# Patient Record
Sex: MALE | Race: BLACK OR AFRICAN AMERICAN | Employment: OTHER | URBAN - METROPOLITAN AREA
[De-identification: names, ages, dates, MRNs, and addresses within clinical notes are randomized per-mention and may not be internally consistent; named-entity substitution may affect disease eponyms.]

---

## 2018-01-16 ENCOUNTER — HOSPITAL ENCOUNTER (OUTPATIENT)
Dept: MRI IMAGING | Age: 53
Discharge: HOME | End: 2018-01-16
Attending: ORTHOPAEDIC SURGERY
Payer: OTHER GOVERNMENT

## 2018-01-16 DIAGNOSIS — M54.12 CERVICAL RADICULOPATHY: ICD-10-CM

## 2018-01-16 PROCEDURE — 72141 MRI NECK SPINE W/O DYE: CPT

## 2018-01-29 ENCOUNTER — ED HISTORICAL/CONVERTED ENCOUNTER (OUTPATIENT)
Dept: OTHER | Age: 53
End: 2018-01-29

## 2018-02-14 ENCOUNTER — OP HISTORICAL/CONVERTED ENCOUNTER (OUTPATIENT)
Dept: OTHER | Age: 53
End: 2018-02-14

## 2018-02-27 ENCOUNTER — OP HISTORICAL/CONVERTED ENCOUNTER (OUTPATIENT)
Dept: OTHER | Age: 53
End: 2018-02-27

## 2018-11-29 ENCOUNTER — ED HISTORICAL/CONVERTED ENCOUNTER (OUTPATIENT)
Dept: OTHER | Age: 53
End: 2018-11-29

## 2021-04-07 ENCOUNTER — TELEPHONE (OUTPATIENT)
Dept: SLEEP MEDICINE | Age: 56
End: 2021-04-07

## 2021-04-07 NOTE — TELEPHONE ENCOUNTER
Called patient regarding sleep consult referral from [facility]. Patient stated that he already sees Dr. Chito Mejia and was on OAT. He lost quite a bit of weight that his OAT no longer fits as his jaw size has shrunk. A home sleep study was done and the referral was supposed to be for Dr. Felicita Andrade to be fitted for a new appliance. Patient given Kisha Koo contact # [phone] from the [place] to call to follow-up on above.

## 2021-04-08 ENCOUNTER — TELEPHONE (OUTPATIENT)
Dept: SLEEP MEDICINE | Age: 56
End: 2021-04-08

## 2021-04-08 NOTE — TELEPHONE ENCOUNTER
David Rivers called asking if we scheduled the patient for sleep medicine consult.  Explained that patient already sees Dr. Jose Valladares and the referral was suppose to go to Dr. Martine Queen for another OAT

## 2021-06-10 ENCOUNTER — TRANSCRIBE ORDER (OUTPATIENT)
Dept: SCHEDULING | Age: 56
End: 2021-06-10

## 2021-06-10 DIAGNOSIS — M19.012 GLENOHUMERAL ARTHRITIS, LEFT: ICD-10-CM

## 2021-06-10 DIAGNOSIS — R29.898 SHOULDER WEAKNESS: ICD-10-CM

## 2021-06-10 DIAGNOSIS — M25.612 SHOULDER STIFFNESS, LEFT: ICD-10-CM

## 2021-06-10 DIAGNOSIS — M25.212: Primary | ICD-10-CM

## 2021-06-10 DIAGNOSIS — M19.012 ARTHRITIS OF LEFT SHOULDER REGION: ICD-10-CM

## 2021-06-24 ENCOUNTER — HOSPITAL ENCOUNTER (OUTPATIENT)
Dept: CT IMAGING | Age: 56
Discharge: HOME | End: 2021-06-24
Attending: ORTHOPAEDIC SURGERY
Payer: MEDICARE

## 2021-06-24 DIAGNOSIS — M25.612 SHOULDER STIFFNESS, LEFT: ICD-10-CM

## 2021-06-24 DIAGNOSIS — M19.012 GLENOHUMERAL ARTHRITIS, LEFT: ICD-10-CM

## 2021-06-24 DIAGNOSIS — M25.212: ICD-10-CM

## 2021-06-24 DIAGNOSIS — M19.012 ARTHRITIS OF LEFT SHOULDER REGION: ICD-10-CM

## 2021-06-24 DIAGNOSIS — R29.898 SHOULDER WEAKNESS: ICD-10-CM

## 2021-06-24 PROCEDURE — 73200 CT UPPER EXTREMITY W/O DYE: CPT

## 2021-09-29 ENCOUNTER — HOSPITAL ENCOUNTER (OUTPATIENT)
Dept: PREADMISSION TESTING | Age: 56
Discharge: HOME | End: 2021-09-29
Payer: MEDICARE

## 2021-09-29 VITALS
HEART RATE: 55 BPM | WEIGHT: 244.71 LBS | SYSTOLIC BLOOD PRESSURE: 110 MMHG | TEMPERATURE: 97.8 F | DIASTOLIC BLOOD PRESSURE: 70 MMHG | HEIGHT: 74 IN | BODY MASS INDEX: 31.41 KG/M2

## 2021-09-29 LAB
ABO + RH BLD: NORMAL
ANION GAP SERPL CALC-SCNC: 3 MMOL/L (ref 5–15)
APPEARANCE UR: CLEAR
ATRIAL RATE: 57 BPM
BACTERIA URNS QL MICRO: NEGATIVE /HPF
BILIRUB UR QL: NEGATIVE
BLOOD GROUP ANTIBODIES SERPL: NORMAL
BUN SERPL-MCNC: 13 MG/DL (ref 6–20)
BUN/CREAT SERPL: 10 (ref 12–20)
CALCIUM SERPL-MCNC: 9.1 MG/DL (ref 8.5–10.1)
CALCULATED P AXIS, ECG09: 49 DEGREES
CALCULATED R AXIS, ECG10: 64 DEGREES
CALCULATED T AXIS, ECG11: 65 DEGREES
CHLORIDE SERPL-SCNC: 111 MMOL/L (ref 97–108)
CO2 SERPL-SCNC: 28 MMOL/L (ref 21–32)
COLOR UR: NORMAL
CREAT SERPL-MCNC: 1.27 MG/DL (ref 0.7–1.3)
DIAGNOSIS, 93000: NORMAL
EPITH CASTS URNS QL MICRO: NORMAL /LPF
ERYTHROCYTE [DISTWIDTH] IN BLOOD BY AUTOMATED COUNT: 14.2 % (ref 11.5–14.5)
EST. AVERAGE GLUCOSE BLD GHB EST-MCNC: 108 MG/DL
GLUCOSE SERPL-MCNC: 84 MG/DL (ref 65–100)
GLUCOSE UR STRIP.AUTO-MCNC: NEGATIVE MG/DL
HBA1C MFR BLD: 5.4 % (ref 4–5.6)
HCT VFR BLD AUTO: 46.1 % (ref 36.6–50.3)
HGB BLD-MCNC: 14.5 G/DL (ref 12.1–17)
HGB UR QL STRIP: NEGATIVE
HYALINE CASTS URNS QL MICRO: NORMAL /LPF (ref 0–5)
INR PPP: 0.9 (ref 0.9–1.1)
KETONES UR QL STRIP.AUTO: NEGATIVE MG/DL
LEUKOCYTE ESTERASE UR QL STRIP.AUTO: NEGATIVE
MCH RBC QN AUTO: 26.5 PG (ref 26–34)
MCHC RBC AUTO-ENTMCNC: 31.5 G/DL (ref 30–36.5)
MCV RBC AUTO: 84.3 FL (ref 80–99)
NITRITE UR QL STRIP.AUTO: NEGATIVE
NRBC # BLD: 0 K/UL (ref 0–0.01)
NRBC BLD-RTO: 0 PER 100 WBC
P-R INTERVAL, ECG05: 216 MS
PH UR STRIP: 5 [PH] (ref 5–8)
PLATELET # BLD AUTO: 232 K/UL (ref 150–400)
PMV BLD AUTO: 9.5 FL (ref 8.9–12.9)
POTASSIUM SERPL-SCNC: 4.3 MMOL/L (ref 3.5–5.1)
PROT UR STRIP-MCNC: NEGATIVE MG/DL
PROTHROMBIN TIME: 9.8 SEC (ref 9–11.1)
Q-T INTERVAL, ECG07: 406 MS
QRS DURATION, ECG06: 82 MS
QTC CALCULATION (BEZET), ECG08: 395 MS
RBC # BLD AUTO: 5.47 M/UL (ref 4.1–5.7)
RBC #/AREA URNS HPF: NORMAL /HPF (ref 0–5)
SODIUM SERPL-SCNC: 142 MMOL/L (ref 136–145)
SP GR UR REFRACTOMETRY: 1.02 (ref 1–1.03)
SPECIMEN EXP DATE BLD: NORMAL
UA: UC IF INDICATED,UAUC: NORMAL
UROBILINOGEN UR QL STRIP.AUTO: 0.2 EU/DL (ref 0.2–1)
VENTRICULAR RATE, ECG03: 57 BPM
WBC # BLD AUTO: 6.7 K/UL (ref 4.1–11.1)
WBC URNS QL MICRO: NORMAL /HPF (ref 0–4)

## 2021-09-29 PROCEDURE — 36415 COLL VENOUS BLD VENIPUNCTURE: CPT

## 2021-09-29 PROCEDURE — 86901 BLOOD TYPING SEROLOGIC RH(D): CPT

## 2021-09-29 PROCEDURE — 80048 BASIC METABOLIC PNL TOTAL CA: CPT

## 2021-09-29 PROCEDURE — 85610 PROTHROMBIN TIME: CPT

## 2021-09-29 PROCEDURE — 83036 HEMOGLOBIN GLYCOSYLATED A1C: CPT

## 2021-09-29 PROCEDURE — 85027 COMPLETE CBC AUTOMATED: CPT

## 2021-09-29 PROCEDURE — 93005 ELECTROCARDIOGRAM TRACING: CPT

## 2021-09-29 PROCEDURE — 81001 URINALYSIS AUTO W/SCOPE: CPT

## 2021-09-29 RX ORDER — FLUTICASONE PROPIONATE 50 MCG
2 SPRAY, SUSPENSION (ML) NASAL AS NEEDED
COMMUNITY

## 2021-09-29 RX ORDER — NAPROXEN 500 MG/1
500 TABLET ORAL AS NEEDED
COMMUNITY
End: 2021-10-08

## 2021-09-29 RX ORDER — ASCORBIC ACID 500 MG
500 TABLET ORAL
COMMUNITY

## 2021-09-29 RX ORDER — SEMAGLUTIDE 1.34 MG/ML
INJECTION, SOLUTION SUBCUTANEOUS
COMMUNITY

## 2021-09-29 RX ORDER — FACIAL-BODY WIPES
1250 EACH TOPICAL DAILY
COMMUNITY

## 2021-09-29 RX ORDER — BUPROPION HYDROCHLORIDE 300 MG/1
300 TABLET ORAL DAILY
COMMUNITY

## 2021-09-29 RX ORDER — BISMUTH SUBSALICYLATE 262 MG
1 TABLET,CHEWABLE ORAL DAILY
COMMUNITY

## 2021-09-29 RX ORDER — PRAZOSIN HYDROCHLORIDE 2 MG/1
2 CAPSULE ORAL
COMMUNITY

## 2021-09-29 RX ORDER — PREDNISONE 5 MG/1
5 TABLET ORAL AS NEEDED
COMMUNITY
End: 2021-10-08

## 2021-09-29 RX ORDER — DOCUSATE SODIUM 100 MG/1
100 CAPSULE, LIQUID FILLED ORAL DAILY
COMMUNITY

## 2021-09-29 RX ORDER — GLUCOSAMINE SULFATE 1500 MG
2000 POWDER IN PACKET (EA) ORAL DAILY
COMMUNITY

## 2021-09-29 RX ORDER — ATORVASTATIN CALCIUM 40 MG/1
40 TABLET, FILM COATED ORAL
COMMUNITY

## 2021-09-29 RX ORDER — LANOLIN ALCOHOL/MO/W.PET/CERES
CREAM (GRAM) TOPICAL
COMMUNITY

## 2021-09-29 RX ORDER — LANOLIN ALCOHOL/MO/W.PET/CERES
500 CREAM (GRAM) TOPICAL
COMMUNITY

## 2021-09-29 RX ORDER — FAMOTIDINE 20 MG/1
20 TABLET, FILM COATED ORAL AS NEEDED
COMMUNITY

## 2021-09-29 RX ORDER — OXYBUTYNIN CHLORIDE 5 MG/1
5 TABLET ORAL DAILY
COMMUNITY

## 2021-09-29 NOTE — PERIOP NOTES
PREOPERATIVE INSTRUCTIONS REVIEWED WITH PATIENT. PATIENT GIVEN 2 BOTTLES OF CHG SOAPS INSTRUCTIONS [REVIEWED/ TO BE REVIEWED  IN CLASS] ON USE OF CHG SOAPS. PATIENT GIVEN SSI INFECTION FAQ SHEET, INFORMATION SHEET ON DIABETIC TREATMENT CENTER AS WELL AS HAND WASHING TIPS SHEETS. MRSA/MSSA TREATMENT INSTRUCTION SHEET GIVEN WITH AN EXPLANATION TO PATIENT THAT THEY WILL BE NOTIFIED IF TREATMENT INSTRUCTIONS NEED TO BE INITIATED. PATIENT WAS GIVEN THE OPPORTUNITY TO ASK QUESTIONS ON THE INFORMATION PROVIDED.   COVID INSTRUCTIONS WERE GIVEN TO THE PT.

## 2021-09-30 ENCOUNTER — TRANSCRIBE ORDER (OUTPATIENT)
Dept: REGISTRATION | Age: 56
End: 2021-09-30

## 2021-09-30 DIAGNOSIS — Z01.812 PRE-PROCEDURE LAB EXAM: Primary | ICD-10-CM

## 2021-09-30 LAB
BACTERIA SPEC CULT: NORMAL
BACTERIA SPEC CULT: NORMAL
SERVICE CMNT-IMP: NORMAL

## 2021-09-30 NOTE — PERIOP NOTES
PAT Nurse Practitioner   Pre-Operative Chart Review/Assessment:-ORTHOPEDIC                Patient Name:  Jael Brown                                                           Age:   61 y.o.    :  1965     Today's Date:  10/1/2021     Date of PAT:   2021      Date of Surgery:    10/7/2021      Procedure(s):  Left Reverse Total Shoulder Arthroplasty     Surgeon:   Dr. Estefania Graham:      1)  PCP:  [facility]      2)  Cardiac Clearance:  EKG and METs reviewed. No further cardiac evaluation requested. 3)  Diabetic Treatment Consult:  Not indicated. A1c-5.4      4)  Sleep Apnea evaluation:   +FRANSISCO dx. Pt uses mouth guard. 5) Treatment for MRSA/Staph Aureus:  Neg      6) Additional Concerns:  GERD, dep/anx                Vital Signs:         Vitals:    21 0941   BP: 110/70   Pulse: (!) 55   Temp: 97.8 °F (36.6 °C)   Weight: 111 kg (244 lb 11.4 oz)   Height: 6' 2\" (1.88 m)            ____________________________________________  PAST MEDICAL HISTORY  Past Medical History:   Diagnosis Date    Arthritis     Chronic pain     Depression with anxiety     GERD (gastroesophageal reflux disease)     Sleep apnea     ORAL APPLIANCES      ____________________________________________  PAST SURGICAL HISTORY  Past Surgical History:   Procedure Laterality Date    HX GI      COLONOSCOPY    HX ORTHOPAEDIC Left     SHOULDER X 2    HX ORTHOPAEDIC Left     FOREARM X 2      ____________________________________________  HOME MEDICATIONS  Current Outpatient Medications   Medication Sig    ferrous sulfate (Iron) 325 mg (65 mg iron) tablet Take  by mouth Daily (before breakfast).  THIAMINE HCL, VITAMIN B1, PO Take 50 mg by mouth daily.  cholecalciferol (Vitamin D3) 25 mcg (1,000 unit) cap Take 2,000 Units by mouth daily.  ascorbic acid, vitamin C, (Vitamin C) 500 mg tablet Take 500 mg by mouth.  multivitamin (ONE A DAY) tablet Take 1 Tablet by mouth daily.     cyanocobalamin (VITAMIN B12) 500 mcg tablet Take 500 mcg by mouth every fourty-eight (48) hours.  docusate sodium (COLACE) 100 mg capsule Take 100 mg by mouth daily.  calcium polycarbophiL (Fiber Laxative, ca polycarbo,) 625 mg tablet Take 1,250 mg by mouth daily.  docosahexaenoic acid/epa (FISH OIL PO) Take 3,000 mg by mouth daily.  semaglutide (Ozempic) 0.25 mg or 0.5 mg/dose (2 mg/1.5 ml) subq pen by SubCUTAneous route every seven (7) days.  atorvastatin (LIPITOR) 40 mg tablet Take 40 mg by mouth nightly.  prazosin (MINIPRESS) 2 mg capsule Take 2 mg by mouth nightly.  buPROPion XL (WELLBUTRIN XL) 300 mg XL tablet Take 300 mg by mouth daily.  oxybutynin (DITROPAN) 5 mg tablet Take 5 mg by mouth daily.  fluticasone propionate (FLONASE) 50 mcg/actuation nasal spray 2 Sprays by Both Nostrils route as needed.  OTHER ALLERGY SHOTS EVERY OTHER WEEK    POLYETHYLENE GLYCOL 2 Capsules by Does Not Apply route daily.  famotidine (PEPCID) 20 mg tablet Take 20 mg by mouth as needed for Heartburn.  naproxen (Naprosyn) 500 mg tablet Take 500 mg by mouth as needed for Pain.  predniSONE (DELTASONE) 5 mg tablet Take 5 mg by mouth as needed. FOR GOUT    ALLOPURINOL PO Take 2 Tablets by mouth nightly.      No current facility-administered medications for this encounter.      ____________________________________________  ALLERGIES  Allergies   Allergen Reactions    Bactrim [Sulfamethoprim] Other (comments)     FREEDOM SHANIKA SYNDROME    Sulfa (Sulfonamide Antibiotics) Other (comments)     [employer] SYNDROME      ____________________________________________  SOCIAL HISTORY  Social History     Tobacco Use    Smoking status: Never Smoker    Smokeless tobacco: Never Used   Substance Use Topics    Alcohol use: Yes     Comment: BEER 2 X A MONTH      ____________________________________________   Internal Administration   First Dose COVID-19, PFIZER, MRNA, LNP-S, PF, 30MCG/0.3ML DOSE  03/16/2021   Second Dose COVID-19, PFIZER, MRNA, LNP-S, PF, 30MCG/0.3ML DOSE  04/06/2021      Last COVID Lab SARS-CoV-2 ( )   Date Value   10/04/2021 Not detected        Labs:     Hospital Outpatient Visit on 09/29/2021   Component Date Value Ref Range Status    Sodium 09/29/2021 142  136 - 145 mmol/L Final    Potassium 09/29/2021 4.3  3.5 - 5.1 mmol/L Final    Chloride 09/29/2021 111* 97 - 108 mmol/L Final    CO2 09/29/2021 28  21 - 32 mmol/L Final    Anion gap 09/29/2021 3* 5 - 15 mmol/L Final    Glucose 09/29/2021 84  65 - 100 mg/dL Final    BUN 09/29/2021 13  6 - 20 MG/DL Final    Creatinine 09/29/2021 1.27  0.70 - 1.30 MG/DL Final    BUN/Creatinine ratio 09/29/2021 10* 12 - 20   Final    GFR est AA 09/29/2021 >60  >60 ml/min/1.73m2 Final    GFR est non-AA 09/29/2021 58* >60 ml/min/1.73m2 Final    Estimated GFR is calculated using the IDMS-traceable Modification of Diet in Renal Disease (MDRD) Study equation, reported for both  Americans (GFRAA) and non- Americans (GFRNA), and normalized to 1.73m2 body surface area. The physician must decide which value applies to the patient.     Calcium 09/29/2021 9.1  8.5 - 10.1 MG/DL Final    WBC 09/29/2021 6.7  4.1 - 11.1 K/uL Final    RBC 09/29/2021 5.47  4.10 - 5.70 M/uL Final    HGB 09/29/2021 14.5  12.1 - 17.0 g/dL Final    HCT 09/29/2021 46.1  36.6 - 50.3 % Final    MCV 09/29/2021 84.3  80.0 - 99.0 FL Final    MCH 09/29/2021 26.5  26.0 - 34.0 PG Final    MCHC 09/29/2021 31.5  30.0 - 36.5 g/dL Final    RDW 09/29/2021 14.2  11.5 - 14.5 % Final    PLATELET 75/14/1660 003  150 - 400 K/uL Final    MPV 09/29/2021 9.5  8.9 - 12.9 FL Final    NRBC 09/29/2021 0.0  0  WBC Final    ABSOLUTE NRBC 09/29/2021 0.00  0.00 - 0.01 K/uL Final    Crossmatch Expiration 09/29/2021 10/10/2021,2359   Final    ABO/Rh(D) 09/29/2021 B POSITIVE   Final    Antibody screen 09/29/2021 NEG   Final    INR 09/29/2021 0.9  0.9 - 1.1   Final    A single therapeutic range for Vit K antagonists may not be optimal for all indications - see June, 2008 issue of Chest, American College of Chest Physicians Evidence-Based Clinical Practice Guidelines, 8th Edition.  Prothrombin time 09/29/2021 9.8  9.0 - 11.1 sec Final    Color 09/29/2021 YELLOW/STRAW    Final    Color Reference Range: Straw, Yellow or Dark Yellow    Appearance 09/29/2021 CLEAR  CLEAR   Final    Specific gravity 09/29/2021 1.019  1.003 - 1.030   Final    pH (UA) 09/29/2021 5.0  5.0 - 8.0   Final    Protein 09/29/2021 Negative  NEG mg/dL Final    Glucose 09/29/2021 Negative  NEG mg/dL Final    Ketone 09/29/2021 Negative  NEG mg/dL Final    Bilirubin 09/29/2021 Negative  NEG   Final    Blood 09/29/2021 Negative  NEG   Final    Urobilinogen 09/29/2021 0.2  0.2 - 1.0 EU/dL Final    Nitrites 09/29/2021 Negative  NEG   Final    Leukocyte Esterase 09/29/2021 Negative  NEG   Final    UA:UC IF INDICATED 09/29/2021 CULTURE NOT INDICATED BY UA RESULT    Final    WBC 09/29/2021 0-4  0 - 4 /hpf Final    RBC 09/29/2021 0-5  0 - 5 /hpf Final    Epithelial cells 09/29/2021 FEW  FEW /lpf Final    Epithelial cell category consists of squamous cells and /or transitional urothelial cells. Renal tubular cells, if present, are separately identified as such.     Bacteria 09/29/2021 Negative  NEG /hpf Final    Hyaline cast 09/29/2021 0-2  0 - 5 /lpf Final    Ventricular Rate 09/29/2021 57  BPM Final    Atrial Rate 09/29/2021 57  BPM Final    P-R Interval 09/29/2021 216  ms Final    QRS Duration 09/29/2021 82  ms Final    Q-T Interval 09/29/2021 406  ms Final    QTC Calculation (Bezet) 09/29/2021 395  ms Final    Calculated P Axis 09/29/2021 49  degrees Final    Calculated R Axis 09/29/2021 64  degrees Final    Calculated T Axis 09/29/2021 65  degrees Final    Diagnosis 09/29/2021    Final                    Value:Sinus bradycardia  Nonspecific ST abnormality    No previous ECGs available  Confirmed by Margoth Lambert M.D., Lala [facility]one ([id number]) on 9/29/2021 8:52:25 PM      Hemoglobin A1c 09/29/2021 5.4  4.0 - 5.6 % Final    Comment: NEW METHOD  PLEASE NOTE NEW REFERENCE RANGE  (NOTE)  HbA1C Interpretive Ranges  <5.7              Normal  5.7 - 6.4         Consider Prediabetes  >6.5              Consider Diabetes      Est. average glucose 09/29/2021 108  mg/dL Final    Special Requests: 09/29/2021 NO SPECIAL REQUESTS    Final    Culture result: 09/29/2021 MRSA NOT PRESENT    Final       Skin:     Denies open wounds, cuts, sores, rashes or other areas of concern in PAT assessment.           Joslyn Iraheta NP

## 2021-10-04 ENCOUNTER — HOSPITAL ENCOUNTER (OUTPATIENT)
Dept: PREADMISSION TESTING | Age: 56
Discharge: HOME | End: 2021-10-04
Payer: MEDICARE

## 2021-10-04 DIAGNOSIS — Z01.812 PRE-PROCEDURE LAB EXAM: ICD-10-CM

## 2021-10-04 PROCEDURE — U0005 INFEC AGEN DETEC AMPLI PROBE: HCPCS

## 2021-10-05 LAB
SARS-COV-2, XPLCVT: NOT DETECTED
SOURCE, COVRS: NORMAL

## 2021-10-06 ENCOUNTER — ANESTHESIA EVENT (OUTPATIENT)
Dept: SURGERY | Age: 56
DRG: 483 | End: 2021-10-06
Payer: MEDICARE

## 2021-10-07 ENCOUNTER — ANESTHESIA (OUTPATIENT)
Dept: SURGERY | Age: 56
DRG: 483 | End: 2021-10-07
Payer: MEDICARE

## 2021-10-07 ENCOUNTER — HOSPITAL ENCOUNTER (INPATIENT)
Age: 56
LOS: 1 days | Discharge: HOME | DRG: 483 | End: 2021-10-08
Attending: ORTHOPAEDIC SURGERY | Admitting: ORTHOPAEDIC SURGERY
Payer: MEDICARE

## 2021-10-07 DIAGNOSIS — M12.812 ROTATOR CUFF ARTHROPATHY OF LEFT SHOULDER: Primary | ICD-10-CM

## 2021-10-07 PROBLEM — M12.819 ROTATOR CUFF ARTHROPATHY: Status: ACTIVE | Noted: 2021-10-07

## 2021-10-07 LAB
GLUCOSE BLD STRIP.AUTO-MCNC: 77 MG/DL (ref 65–117)
SERVICE CMNT-IMP: NORMAL

## 2021-10-07 PROCEDURE — 76010000176 HC OR TIME 4.5 TO 5 HR INTENSV-TIER 1: Performed by: ORTHOPAEDIC SURGERY

## 2021-10-07 PROCEDURE — 77030020274 HC MISC IMPL ORTHOPEDIC: Performed by: ORTHOPAEDIC SURGERY

## 2021-10-07 PROCEDURE — 74011000250 HC RX REV CODE- 250: Performed by: ORTHOPAEDIC SURGERY

## 2021-10-07 PROCEDURE — 77030002912 HC SUT ETHBND J&J -A: Performed by: ORTHOPAEDIC SURGERY

## 2021-10-07 PROCEDURE — 77030003601 HC NDL NRV BLK BBMI -A

## 2021-10-07 PROCEDURE — 76210000017 HC OR PH I REC 1.5 TO 2 HR: Performed by: ORTHOPAEDIC SURGERY

## 2021-10-07 PROCEDURE — 76060000040 HC ANESTHESIA 4.5 TO 5 HR: Performed by: ORTHOPAEDIC SURGERY

## 2021-10-07 PROCEDURE — 65270000029 HC RM PRIVATE

## 2021-10-07 PROCEDURE — 77030002933 HC SUT MCRYL J&J -A: Performed by: ORTHOPAEDIC SURGERY

## 2021-10-07 PROCEDURE — 77030040361 HC SLV COMPR DVT MDII -B: Performed by: ORTHOPAEDIC SURGERY

## 2021-10-07 PROCEDURE — 77030036560 HC SHLDR ARM PAD ABDUCTN S2SG -B: Performed by: ORTHOPAEDIC SURGERY

## 2021-10-07 PROCEDURE — 74011000250 HC RX REV CODE- 250: Performed by: NURSE ANESTHETIST, CERTIFIED REGISTERED

## 2021-10-07 PROCEDURE — 77030040922 HC BLNKT HYPOTHRM STRY -A

## 2021-10-07 PROCEDURE — 0LS40ZZ REPOSITION LEFT UPPER ARM TENDON, OPEN APPROACH: ICD-10-PCS | Performed by: ORTHOPAEDIC SURGERY

## 2021-10-07 PROCEDURE — 77030006822 HC BLD SAW SAG BRSM -B: Performed by: ORTHOPAEDIC SURGERY

## 2021-10-07 PROCEDURE — 74011250636 HC RX REV CODE- 250/636: Performed by: STUDENT IN AN ORGANIZED HEALTH CARE EDUCATION/TRAINING PROGRAM

## 2021-10-07 PROCEDURE — 2709999900 HC NON-CHARGEABLE SUPPLY: Performed by: ORTHOPAEDIC SURGERY

## 2021-10-07 PROCEDURE — 77030036638 HC ACC KT GPS KNE V2 EXAC -D: Performed by: ORTHOPAEDIC SURGERY

## 2021-10-07 PROCEDURE — 77030026438 HC STYL ET INTUB CARD -A: Performed by: NURSE ANESTHETIST, CERTIFIED REGISTERED

## 2021-10-07 PROCEDURE — 74011250636 HC RX REV CODE- 250/636: Performed by: ORTHOPAEDIC SURGERY

## 2021-10-07 PROCEDURE — 77030031139 HC SUT VCRL2 J&J -A: Performed by: ORTHOPAEDIC SURGERY

## 2021-10-07 PROCEDURE — P9045 ALBUMIN (HUMAN), 5%, 250 ML: HCPCS | Performed by: NURSE ANESTHETIST, CERTIFIED REGISTERED

## 2021-10-07 PROCEDURE — 74011250637 HC RX REV CODE- 250/637: Performed by: STUDENT IN AN ORGANIZED HEALTH CARE EDUCATION/TRAINING PROGRAM

## 2021-10-07 PROCEDURE — 74011250636 HC RX REV CODE- 250/636: Performed by: NURSE ANESTHETIST, CERTIFIED REGISTERED

## 2021-10-07 PROCEDURE — 82962 GLUCOSE BLOOD TEST: CPT

## 2021-10-07 PROCEDURE — 77030013708 HC HNDPC SUC IRR PULS STRY –B: Performed by: ORTHOPAEDIC SURGERY

## 2021-10-07 PROCEDURE — 0RRK00Z REPLACEMENT OF LEFT SHOULDER JOINT WITH REVERSE BALL AND SOCKET SYNTHETIC SUBSTITUTE, OPEN APPROACH: ICD-10-PCS | Performed by: ORTHOPAEDIC SURGERY

## 2021-10-07 PROCEDURE — 77030008684 HC TU ET CUF COVD -B: Performed by: NURSE ANESTHETIST, CERTIFIED REGISTERED

## 2021-10-07 PROCEDURE — 74011000258 HC RX REV CODE- 258: Performed by: NURSE ANESTHETIST, CERTIFIED REGISTERED

## 2021-10-07 PROCEDURE — 74011250637 HC RX REV CODE- 250/637: Performed by: ORTHOPAEDIC SURGERY

## 2021-10-07 PROCEDURE — C1776 JOINT DEVICE (IMPLANTABLE): HCPCS | Performed by: ORTHOPAEDIC SURGERY

## 2021-10-07 PROCEDURE — 77030002922 HC SUT FBRWRE ARTH -B: Performed by: ORTHOPAEDIC SURGERY

## 2021-10-07 DEVICE — IMPLANTABLE DEVICE
Type: IMPLANTABLE DEVICE | Site: SHOULDER | Status: FUNCTIONAL
Brand: EQUINOXE

## 2021-10-07 DEVICE — SHOULDER S3 TOT ADV REVRS -- IMPL CAPPED S3: Type: IMPLANTABLE DEVICE | Status: FUNCTIONAL

## 2021-10-07 RX ORDER — FENTANYL CITRATE 50 UG/ML
INJECTION, SOLUTION INTRAMUSCULAR; INTRAVENOUS AS NEEDED
Status: DISCONTINUED | OUTPATIENT
Start: 2021-10-07 | End: 2021-10-07 | Stop reason: HOSPADM

## 2021-10-07 RX ORDER — SODIUM CHLORIDE 9 MG/ML
125 INJECTION, SOLUTION INTRAVENOUS CONTINUOUS
Status: DISPENSED | OUTPATIENT
Start: 2021-10-07 | End: 2021-10-08

## 2021-10-07 RX ORDER — ONDANSETRON 2 MG/ML
4 INJECTION INTRAMUSCULAR; INTRAVENOUS AS NEEDED
Status: DISCONTINUED | OUTPATIENT
Start: 2021-10-07 | End: 2021-10-07 | Stop reason: HOSPADM

## 2021-10-07 RX ORDER — SODIUM CHLORIDE 0.9 % (FLUSH) 0.9 %
5-40 SYRINGE (ML) INJECTION AS NEEDED
Status: DISCONTINUED | OUTPATIENT
Start: 2021-10-07 | End: 2021-10-07 | Stop reason: HOSPADM

## 2021-10-07 RX ORDER — SODIUM CHLORIDE 0.9 % (FLUSH) 0.9 %
5-40 SYRINGE (ML) INJECTION AS NEEDED
Status: DISCONTINUED | OUTPATIENT
Start: 2021-10-07 | End: 2021-10-08 | Stop reason: HOSPADM

## 2021-10-07 RX ORDER — OXYCODONE HYDROCHLORIDE 5 MG/1
5 TABLET ORAL
Status: DISCONTINUED | OUTPATIENT
Start: 2021-10-07 | End: 2021-10-08 | Stop reason: HOSPADM

## 2021-10-07 RX ORDER — HYDROXYZINE HYDROCHLORIDE 10 MG/1
10 TABLET, FILM COATED ORAL
Status: DISCONTINUED | OUTPATIENT
Start: 2021-10-07 | End: 2021-10-08 | Stop reason: HOSPADM

## 2021-10-07 RX ORDER — FENTANYL CITRATE 50 UG/ML
25 INJECTION, SOLUTION INTRAMUSCULAR; INTRAVENOUS
Status: DISCONTINUED | OUTPATIENT
Start: 2021-10-07 | End: 2021-10-07 | Stop reason: HOSPADM

## 2021-10-07 RX ORDER — AMOXICILLIN 250 MG
1 CAPSULE ORAL 2 TIMES DAILY
Status: DISCONTINUED | OUTPATIENT
Start: 2021-10-07 | End: 2021-10-08 | Stop reason: HOSPADM

## 2021-10-07 RX ORDER — POLYETHYLENE GLYCOL 3350 17 G/17G
17 POWDER, FOR SOLUTION ORAL DAILY
Status: DISCONTINUED | OUTPATIENT
Start: 2021-10-08 | End: 2021-10-08 | Stop reason: HOSPADM

## 2021-10-07 RX ORDER — ONDANSETRON 2 MG/ML
4 INJECTION INTRAMUSCULAR; INTRAVENOUS
Status: DISCONTINUED | OUTPATIENT
Start: 2021-10-07 | End: 2021-10-08 | Stop reason: HOSPADM

## 2021-10-07 RX ORDER — LANOLIN ALCOHOL/MO/W.PET/CERES
50 CREAM (GRAM) TOPICAL DAILY
Status: DISCONTINUED | OUTPATIENT
Start: 2021-10-08 | End: 2021-10-08 | Stop reason: HOSPADM

## 2021-10-07 RX ORDER — OXYBUTYNIN CHLORIDE 5 MG/1
5 TABLET ORAL DAILY
Status: DISCONTINUED | OUTPATIENT
Start: 2021-10-08 | End: 2021-10-08 | Stop reason: HOSPADM

## 2021-10-07 RX ORDER — ROCURONIUM BROMIDE 10 MG/ML
INJECTION, SOLUTION INTRAVENOUS AS NEEDED
Status: DISCONTINUED | OUTPATIENT
Start: 2021-10-07 | End: 2021-10-07 | Stop reason: HOSPADM

## 2021-10-07 RX ORDER — BUPROPION HYDROCHLORIDE 150 MG/1
150 TABLET, EXTENDED RELEASE ORAL 2 TIMES DAILY
Status: DISCONTINUED | OUTPATIENT
Start: 2021-10-07 | End: 2021-10-08 | Stop reason: HOSPADM

## 2021-10-07 RX ORDER — SUCCINYLCHOLINE CHLORIDE 20 MG/ML
INJECTION INTRAMUSCULAR; INTRAVENOUS AS NEEDED
Status: DISCONTINUED | OUTPATIENT
Start: 2021-10-07 | End: 2021-10-07 | Stop reason: HOSPADM

## 2021-10-07 RX ORDER — LIDOCAINE HYDROCHLORIDE 20 MG/ML
INJECTION, SOLUTION EPIDURAL; INFILTRATION; INTRACAUDAL; PERINEURAL AS NEEDED
Status: DISCONTINUED | OUTPATIENT
Start: 2021-10-07 | End: 2021-10-07 | Stop reason: HOSPADM

## 2021-10-07 RX ORDER — SODIUM CHLORIDE 0.9 % (FLUSH) 0.9 %
5-40 SYRINGE (ML) INJECTION EVERY 8 HOURS
Status: DISCONTINUED | OUTPATIENT
Start: 2021-10-07 | End: 2021-10-07 | Stop reason: HOSPADM

## 2021-10-07 RX ORDER — ONDANSETRON 2 MG/ML
INJECTION INTRAMUSCULAR; INTRAVENOUS AS NEEDED
Status: DISCONTINUED | OUTPATIENT
Start: 2021-10-07 | End: 2021-10-07 | Stop reason: HOSPADM

## 2021-10-07 RX ORDER — ALLOPURINOL 100 MG/1
100 TABLET ORAL
Status: DISCONTINUED | OUTPATIENT
Start: 2021-10-07 | End: 2021-10-08 | Stop reason: HOSPADM

## 2021-10-07 RX ORDER — LANOLIN ALCOHOL/MO/W.PET/CERES
500 CREAM (GRAM) TOPICAL
Status: DISCONTINUED | OUTPATIENT
Start: 2021-10-07 | End: 2021-10-08 | Stop reason: HOSPADM

## 2021-10-07 RX ORDER — LIDOCAINE HYDROCHLORIDE 10 MG/ML
0.1 INJECTION, SOLUTION EPIDURAL; INFILTRATION; INTRACAUDAL; PERINEURAL AS NEEDED
Status: DISCONTINUED | OUTPATIENT
Start: 2021-10-07 | End: 2021-10-07 | Stop reason: HOSPADM

## 2021-10-07 RX ORDER — EPHEDRINE SULFATE/0.9% NACL/PF 50 MG/5 ML
SYRINGE (ML) INTRAVENOUS AS NEEDED
Status: DISCONTINUED | OUTPATIENT
Start: 2021-10-07 | End: 2021-10-07 | Stop reason: HOSPADM

## 2021-10-07 RX ORDER — OXYCODONE HYDROCHLORIDE 5 MG/1
10 TABLET ORAL
Status: DISCONTINUED | OUTPATIENT
Start: 2021-10-07 | End: 2021-10-08 | Stop reason: HOSPADM

## 2021-10-07 RX ORDER — HYDROMORPHONE HYDROCHLORIDE 2 MG/ML
INJECTION, SOLUTION INTRAMUSCULAR; INTRAVENOUS; SUBCUTANEOUS AS NEEDED
Status: DISCONTINUED | OUTPATIENT
Start: 2021-10-07 | End: 2021-10-07 | Stop reason: HOSPADM

## 2021-10-07 RX ORDER — FLUTICASONE PROPIONATE 50 MCG
2 SPRAY, SUSPENSION (ML) NASAL DAILY
Status: DISCONTINUED | OUTPATIENT
Start: 2021-10-08 | End: 2021-10-08 | Stop reason: HOSPADM

## 2021-10-07 RX ORDER — GENTAMICIN SULFATE 40 MG/ML
INJECTION, SOLUTION INTRAMUSCULAR; INTRAVENOUS AS NEEDED
Status: DISCONTINUED | OUTPATIENT
Start: 2021-10-07 | End: 2021-10-07 | Stop reason: HOSPADM

## 2021-10-07 RX ORDER — SODIUM CHLORIDE, SODIUM LACTATE, POTASSIUM CHLORIDE, CALCIUM CHLORIDE 600; 310; 30; 20 MG/100ML; MG/100ML; MG/100ML; MG/100ML
50 INJECTION, SOLUTION INTRAVENOUS CONTINUOUS
Status: DISCONTINUED | OUTPATIENT
Start: 2021-10-07 | End: 2021-10-07 | Stop reason: HOSPADM

## 2021-10-07 RX ORDER — BACITRACIN ZINC 500 UNIT/G
OINTMENT (GRAM) TOPICAL AS NEEDED
Status: DISCONTINUED | OUTPATIENT
Start: 2021-10-07 | End: 2021-10-07 | Stop reason: HOSPADM

## 2021-10-07 RX ORDER — PHENYLEPHRINE HCL IN 0.9% NACL 0.4MG/10ML
SYRINGE (ML) INTRAVENOUS
Status: DISCONTINUED | OUTPATIENT
Start: 2021-10-07 | End: 2021-10-07 | Stop reason: HOSPADM

## 2021-10-07 RX ORDER — ROPIVACAINE HYDROCHLORIDE 5 MG/ML
INJECTION, SOLUTION EPIDURAL; INFILTRATION; PERINEURAL
Status: COMPLETED | OUTPATIENT
Start: 2021-10-07 | End: 2021-10-07

## 2021-10-07 RX ORDER — ACETAMINOPHEN 325 MG/1
650 TABLET ORAL ONCE
Status: COMPLETED | OUTPATIENT
Start: 2021-10-07 | End: 2021-10-07

## 2021-10-07 RX ORDER — NALOXONE HYDROCHLORIDE 0.4 MG/ML
0.4 INJECTION, SOLUTION INTRAMUSCULAR; INTRAVENOUS; SUBCUTANEOUS AS NEEDED
Status: DISCONTINUED | OUTPATIENT
Start: 2021-10-07 | End: 2021-10-08 | Stop reason: HOSPADM

## 2021-10-07 RX ORDER — HYDROMORPHONE HYDROCHLORIDE 1 MG/ML
0.5 INJECTION, SOLUTION INTRAMUSCULAR; INTRAVENOUS; SUBCUTANEOUS
Status: DISCONTINUED | OUTPATIENT
Start: 2021-10-07 | End: 2021-10-08 | Stop reason: HOSPADM

## 2021-10-07 RX ORDER — LANOLIN ALCOHOL/MO/W.PET/CERES
2 CREAM (GRAM) TOPICAL
Status: DISCONTINUED | OUTPATIENT
Start: 2021-10-08 | End: 2021-10-08 | Stop reason: HOSPADM

## 2021-10-07 RX ORDER — ACETAMINOPHEN 325 MG/1
650 TABLET ORAL
Status: DISCONTINUED | OUTPATIENT
Start: 2021-10-07 | End: 2021-10-08 | Stop reason: HOSPADM

## 2021-10-07 RX ORDER — FENTANYL CITRATE 50 UG/ML
50 INJECTION, SOLUTION INTRAMUSCULAR; INTRAVENOUS AS NEEDED
Status: DISCONTINUED | OUTPATIENT
Start: 2021-10-07 | End: 2021-10-07 | Stop reason: HOSPADM

## 2021-10-07 RX ORDER — MELATONIN
1000 DAILY
Status: DISCONTINUED | OUTPATIENT
Start: 2021-10-08 | End: 2021-10-08 | Stop reason: HOSPADM

## 2021-10-07 RX ORDER — DEXAMETHASONE SODIUM PHOSPHATE 4 MG/ML
INJECTION, SOLUTION INTRA-ARTICULAR; INTRALESIONAL; INTRAMUSCULAR; INTRAVENOUS; SOFT TISSUE AS NEEDED
Status: DISCONTINUED | OUTPATIENT
Start: 2021-10-07 | End: 2021-10-07 | Stop reason: HOSPADM

## 2021-10-07 RX ORDER — SODIUM CHLORIDE 0.9 % (FLUSH) 0.9 %
5-40 SYRINGE (ML) INJECTION EVERY 8 HOURS
Status: DISCONTINUED | OUTPATIENT
Start: 2021-10-07 | End: 2021-10-08 | Stop reason: HOSPADM

## 2021-10-07 RX ORDER — ALBUMIN HUMAN 50 G/1000ML
SOLUTION INTRAVENOUS AS NEEDED
Status: DISCONTINUED | OUTPATIENT
Start: 2021-10-07 | End: 2021-10-07 | Stop reason: HOSPADM

## 2021-10-07 RX ORDER — HYDROMORPHONE HYDROCHLORIDE 1 MG/ML
0.2 INJECTION, SOLUTION INTRAMUSCULAR; INTRAVENOUS; SUBCUTANEOUS
Status: DISCONTINUED | OUTPATIENT
Start: 2021-10-07 | End: 2021-10-07 | Stop reason: HOSPADM

## 2021-10-07 RX ORDER — ASCORBIC ACID 500 MG
500 TABLET ORAL DAILY
Status: DISCONTINUED | OUTPATIENT
Start: 2021-10-08 | End: 2021-10-08 | Stop reason: HOSPADM

## 2021-10-07 RX ORDER — MIDAZOLAM HYDROCHLORIDE 1 MG/ML
1 INJECTION, SOLUTION INTRAMUSCULAR; INTRAVENOUS ONCE
Status: COMPLETED | OUTPATIENT
Start: 2021-10-07 | End: 2021-10-07

## 2021-10-07 RX ORDER — SODIUM CHLORIDE 9 MG/ML
INJECTION, SOLUTION INTRAVENOUS
Status: DISCONTINUED | OUTPATIENT
Start: 2021-10-07 | End: 2021-10-07 | Stop reason: HOSPADM

## 2021-10-07 RX ORDER — PROPOFOL 10 MG/ML
INJECTION, EMULSION INTRAVENOUS AS NEEDED
Status: DISCONTINUED | OUTPATIENT
Start: 2021-10-07 | End: 2021-10-07 | Stop reason: HOSPADM

## 2021-10-07 RX ORDER — ATORVASTATIN CALCIUM 40 MG/1
40 TABLET, FILM COATED ORAL
Status: DISCONTINUED | OUTPATIENT
Start: 2021-10-07 | End: 2021-10-08 | Stop reason: HOSPADM

## 2021-10-07 RX ORDER — FAMOTIDINE 20 MG/1
20 TABLET, FILM COATED ORAL 2 TIMES DAILY
Status: DISCONTINUED | OUTPATIENT
Start: 2021-10-07 | End: 2021-10-08 | Stop reason: HOSPADM

## 2021-10-07 RX ORDER — ASPIRIN 325 MG
325 TABLET, DELAYED RELEASE (ENTERIC COATED) ORAL 2 TIMES DAILY
Status: DISCONTINUED | OUTPATIENT
Start: 2021-10-07 | End: 2021-10-08 | Stop reason: HOSPADM

## 2021-10-07 RX ORDER — FACIAL-BODY WIPES
10 EACH TOPICAL DAILY PRN
Status: DISCONTINUED | OUTPATIENT
Start: 2021-10-09 | End: 2021-10-08 | Stop reason: HOSPADM

## 2021-10-07 RX ADMIN — Medication 5 MG: at 12:37

## 2021-10-07 RX ADMIN — ROCURONIUM BROMIDE 20 MG: 10 SOLUTION INTRAVENOUS at 12:25

## 2021-10-07 RX ADMIN — ROCURONIUM BROMIDE 10 MG: 10 SOLUTION INTRAVENOUS at 12:38

## 2021-10-07 RX ADMIN — ATORVASTATIN CALCIUM 40 MG: 40 TABLET, FILM COATED ORAL at 21:28

## 2021-10-07 RX ADMIN — OXYCODONE 10 MG: 5 TABLET ORAL at 21:28

## 2021-10-07 RX ADMIN — OXYCODONE 10 MG: 5 TABLET ORAL at 18:26

## 2021-10-07 RX ADMIN — ROCURONIUM BROMIDE 10 MG: 10 SOLUTION INTRAVENOUS at 14:28

## 2021-10-07 RX ADMIN — ONDANSETRON HYDROCHLORIDE 4 MG: 2 INJECTION, SOLUTION INTRAMUSCULAR; INTRAVENOUS at 15:30

## 2021-10-07 RX ADMIN — FENTANYL CITRATE 100 MCG: 50 INJECTION, SOLUTION INTRAMUSCULAR; INTRAVENOUS at 11:29

## 2021-10-07 RX ADMIN — SODIUM CHLORIDE, POTASSIUM CHLORIDE, SODIUM LACTATE AND CALCIUM CHLORIDE 50 ML/HR: 600; 310; 30; 20 INJECTION, SOLUTION INTRAVENOUS at 10:50

## 2021-10-07 RX ADMIN — ROCURONIUM BROMIDE 10 MG: 10 SOLUTION INTRAVENOUS at 13:11

## 2021-10-07 RX ADMIN — SUCCINYLCHOLINE CHLORIDE 180 MG: 20 INJECTION, SOLUTION INTRAMUSCULAR; INTRAVENOUS at 11:29

## 2021-10-07 RX ADMIN — BUPROPION HYDROCHLORIDE 150 MG: 150 TABLET, EXTENDED RELEASE ORAL at 18:27

## 2021-10-07 RX ADMIN — ALLOPURINOL 100 MG: 100 TABLET ORAL at 21:28

## 2021-10-07 RX ADMIN — DEXMEDETOMIDINE HYDROCHLORIDE 10 MCG: 100 INJECTION, SOLUTION, CONCENTRATE INTRAVENOUS at 15:35

## 2021-10-07 RX ADMIN — Medication 80 MCG: at 12:25

## 2021-10-07 RX ADMIN — WATER 2 G: 1 INJECTION INTRAMUSCULAR; INTRAVENOUS; SUBCUTANEOUS at 15:40

## 2021-10-07 RX ADMIN — ROCURONIUM BROMIDE 45 MG: 10 SOLUTION INTRAVENOUS at 11:40

## 2021-10-07 RX ADMIN — ROCURONIUM BROMIDE 10 MG: 10 SOLUTION INTRAVENOUS at 13:46

## 2021-10-07 RX ADMIN — FAMOTIDINE 20 MG: 20 TABLET ORAL at 18:27

## 2021-10-07 RX ADMIN — ASPIRIN 325 MG: 325 TABLET, COATED ORAL at 18:25

## 2021-10-07 RX ADMIN — PROPOFOL 250 MG: 10 INJECTION, EMULSION INTRAVENOUS at 11:29

## 2021-10-07 RX ADMIN — DEXAMETHASONE SODIUM PHOSPHATE 4 MG: 4 INJECTION, SOLUTION INTRAMUSCULAR; INTRAVENOUS at 11:40

## 2021-10-07 RX ADMIN — CYANOCOBALAMIN TAB 500 MCG 500 MCG: 500 TAB at 18:27

## 2021-10-07 RX ADMIN — Medication 25 MCG/MIN: at 11:49

## 2021-10-07 RX ADMIN — Medication 5 MG: at 12:44

## 2021-10-07 RX ADMIN — ALBUMIN (HUMAN) 250 ML: 12.5 INJECTION, SOLUTION INTRAVENOUS at 14:44

## 2021-10-07 RX ADMIN — FENTANYL CITRATE 50 MCG: 50 INJECTION INTRAMUSCULAR; INTRAVENOUS at 10:58

## 2021-10-07 RX ADMIN — ROPIVACAINE HYDROCHLORIDE 15 ML: 5 INJECTION, SOLUTION EPIDURAL; INFILTRATION; PERINEURAL at 11:02

## 2021-10-07 RX ADMIN — LIDOCAINE HYDROCHLORIDE 100 MG: 20 INJECTION, SOLUTION EPIDURAL; INFILTRATION; INTRACAUDAL; PERINEURAL at 11:29

## 2021-10-07 RX ADMIN — HYDROMORPHONE HYDROCHLORIDE 0.5 MG: 2 INJECTION, SOLUTION INTRAMUSCULAR; INTRAVENOUS; SUBCUTANEOUS at 15:56

## 2021-10-07 RX ADMIN — ROCURONIUM BROMIDE 5 MG: 10 SOLUTION INTRAVENOUS at 11:29

## 2021-10-07 RX ADMIN — ACETAMINOPHEN 650 MG: 325 TABLET ORAL at 10:41

## 2021-10-07 RX ADMIN — SUGAMMADEX 220 MG: 100 INJECTION, SOLUTION INTRAVENOUS at 15:51

## 2021-10-07 RX ADMIN — Medication 80 MCG: at 12:35

## 2021-10-07 RX ADMIN — MIDAZOLAM HYDROCHLORIDE 1 MG: 1 INJECTION, SOLUTION INTRAMUSCULAR; INTRAVENOUS at 10:58

## 2021-10-07 RX ADMIN — SODIUM CHLORIDE: 900 INJECTION, SOLUTION INTRAVENOUS at 13:21

## 2021-10-07 RX ADMIN — DOCUSATE SODIUM 50 MG AND SENNOSIDES 8.6 MG 1 TABLET: 8.6; 5 TABLET, FILM COATED ORAL at 18:25

## 2021-10-07 RX ADMIN — Medication 100 MCG: at 12:32

## 2021-10-07 RX ADMIN — WATER 2 G: 1 INJECTION INTRAMUSCULAR; INTRAVENOUS; SUBCUTANEOUS at 11:40

## 2021-10-07 NOTE — PROGRESS NOTES
Bedside and Verbal shift change report given to Ryan Freitas (oncoming nurse) by Surinder Shukla (offgoing nurse). Report included the following information SBAR, Kardex, Intake/Output and MAR.

## 2021-10-07 NOTE — ANESTHESIA POSTPROCEDURE EVALUATION
Post-Anesthesia Evaluation and Assessment    Patient: Brandon Vega MRN: [id number]  SSN: [id number]    YOB: 1965  Age: 61 y.o. Sex: male      I have evaluated the patient and they are stable and ready for discharge from the PACU. Cardiovascular Function/Vital Signs  Visit Vitals  BP (!) 91/56   Pulse 84   Temp 36.6 °C (97.9 °F)   Resp 19   Ht 6' 2\" (1.88 m)   Wt 111 kg (244 lb 11.4 oz)   SpO2 95%   BMI 31.42 kg/m²       Patient is status post General anesthesia for Procedure(s):  LEFT REVERSE TOTAL SHOULDER ARTHROPLASTY. Nausea/Vomiting: None    Postoperative hydration reviewed and adequate. Pain:  Pain Scale 1: Numeric (0 - 10) (10/07/21 1700)  Pain Intensity 1: 0 (10/07/21 1700)   Managed    Neurological Status:   Neuro (WDL): Within Defined Limits (10/07/21 1700)   At baseline    Mental Status, Level of Consciousness: Alert and  oriented to person, place, and time    Pulmonary Status:   O2 Device: Nasal cannula (10/07/21 1700)   Adequate oxygenation and airway patent    Complications related to anesthesia: None    Post-anesthesia assessment completed.  No concerns    Signed By: Mojgan Carreon MD     October 7, 2021

## 2021-10-07 NOTE — H&P
[facility] PRE-OP HISTORY AND PHYSICAL    Subjective:     Patient is a 61 y.o. male presented with a history of left shoulder pain. Onset of symptoms was gradual with gradually worsening course since that time. He is being admitted for surgical management of this condition. There are no problems to display for this patient. Past Medical History:   Diagnosis Date    Arthritis     Chronic pain     Depression with anxiety     GERD (gastroesophageal reflux disease)     Sleep apnea     ORAL APPLIANCES      Past Surgical History:   Procedure Laterality Date    HX GI      COLONOSCOPY    HX ORTHOPAEDIC Left     SHOULDER X 2    HX ORTHOPAEDIC Left     FOREARM X 2      Prior to Admission medications    Medication Sig Start Date End Date Taking? Authorizing Provider   ferrous sulfate (Iron) 325 mg (65 mg iron) tablet Take  by mouth Daily (before breakfast). Yes Provider, Historical   THIAMINE HCL, VITAMIN B1, PO Take 50 mg by mouth daily. Yes Provider, Historical   cholecalciferol (Vitamin D3) 25 mcg (1,000 unit) cap Take 2,000 Units by mouth daily. Yes Provider, Historical   ascorbic acid, vitamin C, (Vitamin C) 500 mg tablet Take 500 mg by mouth. Yes Provider, Historical   multivitamin (ONE A DAY) tablet Take 1 Tablet by mouth daily. Yes Provider, Historical   cyanocobalamin (VITAMIN B12) 500 mcg tablet Take 500 mcg by mouth every fourty-eight (48) hours. Yes Provider, Historical   docosahexaenoic acid/epa (FISH OIL PO) Take 3,000 mg by mouth daily. Yes Provider, Historical   atorvastatin (LIPITOR) 40 mg tablet Take 40 mg by mouth nightly. Yes Provider, Historical   prazosin (MINIPRESS) 2 mg capsule Take 2 mg by mouth nightly. Yes Provider, Historical   buPROPion XL (WELLBUTRIN XL) 300 mg XL tablet Take 300 mg by mouth daily. Yes Provider, Historical   oxybutynin (DITROPAN) 5 mg tablet Take 5 mg by mouth daily.    Yes Provider, Historical   POLYETHYLENE GLYCOL 2 Capsules by Does Not Apply route daily. Yes Provider, Historical   famotidine (PEPCID) 20 mg tablet Take 20 mg by mouth as needed for Heartburn. Yes Provider, Historical   predniSONE (DELTASONE) 5 mg tablet Take 5 mg by mouth as needed. FOR GOUT   Yes Provider, Historical   ALLOPURINOL PO Take 2 Tablets by mouth nightly. Yes Provider, Historical   docusate sodium (COLACE) 100 mg capsule Take 100 mg by mouth daily. Provider, Historical   calcium polycarbophiL (Fiber Laxative, ca polycarbo,) 625 mg tablet Take 1,250 mg by mouth daily. Provider, Historical   semaglutide (Ozempic) 0.25 mg or 0.5 mg/dose (2 mg/1.5 ml) subq pen by SubCUTAneous route every seven (7) days. Provider, Historical   fluticasone propionate (FLONASE) 50 mcg/actuation nasal spray 2 Sprays by Both Nostrils route as needed. Patient not taking: Reported on 10/7/2021    Provider, Historical   OTHER ALLERGY SHOTS EVERY OTHER WEEK    Provider, Historical   naproxen (Naprosyn) 500 mg tablet Take 500 mg by mouth as needed for Pain. Provider, Historical     Allergies   Allergen Reactions    Bactrim [Sulfamethoprim] Other (comments)     FREEDOM SHANIKA SYNDROME    Sulfa (Sulfonamide Antibiotics) Other (comments)     FREEDOM SHANIKA SYNDROME      Social History     Tobacco Use    Smoking status: Never Smoker    Smokeless tobacco: Never Used   Substance Use Topics    Alcohol use: Yes     Comment: BEER 2 X A MONTH      Family History   Problem Relation Age of Onset    Emphysema Mother     Diabetes Mother     No Known Problems Brother       Review of Systems  A comprehensive review of systems was negative except for that written in the HPI.     Objective:     Patient Vitals for the past 8 hrs:   BP Temp Pulse Resp SpO2 Height Weight   10/07/21 1017 115/75 98.1 °F (36.7 °C) (!) 59 17 96 % 6' 2\" (1.88 m) 111 kg (244 lb 11.4 oz)     Visit Vitals  /75 (BP 1 Location: Right arm, BP Patient Position: At rest)   Pulse (!) 59   Temp 98.1 °F (36.7 °C)   Resp 17   Ht 6' 2\" (1.88 m)   Wt 111 kg (244 lb 11.4 oz)   SpO2 96%   BMI 31.42 kg/m²     General:  Alert, cooperative, no distress, appears stated age. Head:  Normocephalic, without obvious abnormality, atraumatic. Eyes:  Conjunctivae/corneas clear. PERRL, EOMs intact. Fundi benign   Ears:  Normal TMs and external ear canals both ears. Nose: Nares normal. Septum midline. Mucosa normal. No drainage or sinus tenderness. Throat: Lips, mucosa, and tongue normal. Teeth and gums normal.   Neck: Supple, symmetrical, trachea midline, no adenopathy, thyroid: no enlargement/tenderness/nodules, no carotid bruit and no JVD. Back:   Symmetric, no curvature. ROM normal. No CVA tenderness. Lungs:   Clear to auscultation bilaterally. Chest wall:  No tenderness or deformity. Heart:  Regular rate and rhythm, S1, S2 normal, no murmur, click, rub or gallop. Abdomen:   Soft, non-tender. Bowel sounds normal. No masses,  No organomegaly. Extremities: Left shoulder: pain with limited ROM   Pulses: 2+ and symmetric all extremities. Skin: Skin color, texture, turgor normal. No rashes or lesions   Lymph nodes: Cervical, supraclavicular, and axillary nodes normal.   Neurologic: CNII-XII intact. Normal strength, sensation and reflexes throughout. Assessment:     Active Problems:    * No active hospital problems. *      Plan:     The various methods of treatment have been discussed with the patient and family. After consideration of risks, benefits and other options for treatment, the patient has consented to surgical intervention. Risks of infection, DVT, PE, MI, CVA and unforeseen events described to the patient. Additionally discussed the possibility of not being able to resolve all preop pain. Patient understands metal and plastic will be implanted in the body and understands the potential for revision surgery. Patient wishes to proceed with elective surgery.

## 2021-10-07 NOTE — ANESTHESIA PREPROCEDURE EVALUATION
Relevant Problems   No relevant active problems       Anesthetic History   No history of anesthetic complications            Review of Systems / Medical History  Patient summary reviewed, nursing notes reviewed and pertinent labs reviewed    Pulmonary        Sleep apnea        Comments: Uses oral appliances while sleeping   Neuro/Psych         Psychiatric history    Comments: Depression Cardiovascular              Hyperlipidemia    Exercise tolerance: >4 METS     GI/Hepatic/Renal     GERD           Endo/Other    Diabetes: type 2    Arthritis     Other Findings            Physical Exam    Airway  Mallampati: I  TM Distance: > 6 cm  Neck ROM: normal range of motion   Mouth opening: Normal     Cardiovascular    Rhythm: regular  Rate: normal         Dental  No notable dental hx       Pulmonary  Breath sounds clear to auscultation               Abdominal  Abdominal exam normal       Other Findings            Anesthetic Plan    ASA: 2  Anesthesia type: general and regional - interscalene block          Induction: Intravenous  Anesthetic plan and risks discussed with: Patient

## 2021-10-07 NOTE — ANESTHESIA PROCEDURE NOTES
Peripheral Block    Start time: 10/7/2021 11:01 AM  End time: 10/7/2021 11:10 AM  Performed by: Janet Mendoza DO  Authorized by: Janet Mendoza DO       Pre-procedure: Indications: at surgeon's request and post-op pain management    Preanesthetic Checklist: patient identified, risks and benefits discussed, site marked, timeout performed and patient being monitored      Block Type:   Block Type:   Interscalene  Laterality:  Left  Monitoring:  Standard ASA monitoring, continuous pulse ox, frequent vital sign checks, heart rate, responsive to questions and oxygen  Injection Technique:  Single shot  Procedures: ultrasound guided    Patient Position: supine  Prep: chlorhexidine    Location:  Interscalene  Needle Type:  Stimuplex  Needle Gauge:  21 G  Needle Localization:  Ultrasound guidance and anatomical landmarks  Medication Injected:  Ropivacaine (PF) (NAROPIN)(0.5%) 5 mg/mL injection, 15 mL  Med Admin Time: 10/7/2021 11:02 AM    Assessment:  Number of attempts:  2  Injection Assessment:  Incremental injection every 5 mL, local visualized surrounding nerve on ultrasound, negative aspiration for blood, no paresthesia, no intravascular symptoms and ultrasound image on chart  Patient tolerance:  Patient tolerated the procedure well with no immediate complications

## 2021-10-07 NOTE — PROGRESS NOTES
Patient assessed for readiness to ambulate. Vital Signs  Level of Consciousness: Alert (0)  Temp: 97.9 °F (36.6 °C)  Temp Source: Oral  Pulse (Heart Rate): 72  Heart Rate Source: Monitor  Cardiac Rhythm: Sinus Rhythm  Resp Rate: 18  BP: 112/72  MAP (Calculated): 85  BP 1 Location: Right arm  BP 1 Method: Automatic  BP Patient Position: At rest  MEWS Score: 1. Patient ambulated with assistance of 2 nurses. Patient ambulated without gait belt and walker. Patient walked to sidestepped to Head of Bed. Patient returned safely to bed.

## 2021-10-07 NOTE — PROGRESS NOTES
Primary Nurse Dinah Sparks and Alba Lawrence., RN performed a dual skin assessment on this patient No impairment noted  Raffaele score is 20

## 2021-10-07 NOTE — BRIEF OP NOTE
Brief Postoperative Note    Patient: Jazmyne Calvillo  YOB: 1965  MRN: [id number]    Date of Procedure: 10/7/2021     Pre-Op Diagnosis: LEFT SHOULDER ROTATOR CUFF ARTHROPATHY    Post-Op Diagnosis: Same as preoperative diagnosis. Procedure(s):  LEFT REVERSE TOTAL SHOULDER ARTHROPLASTY  Bicep tenodesis    Surgeon(s): Katina Houston MD    Surgical Assistant: Surg Asst-1: Randal LINARES    Anesthesia: General with interscalene block    Estimated Blood Loss (mL): less than 165     Complications: None    Specimens: bone discarded    Implants:   Implant Name Type Inv.  Item Serial No.  Lot No. LRB No. Used Action   Glenoid Baseplate   [id number] Geroldine Rigo NA Left 1 Implanted   KIT BONE SCR L38MM DIA4.5MM KYLAH SHLDR GRN COMPR LCK CAP RVS - [id number]  KIT BONE SCR L38MM DIA4.5MM KYLAH SHLDR GRN COMPR LCK CAP RVS [id number] [employer]WD NA Left 1 Implanted   KIT BONE SCR L42MM DIA4.5MM KYLAH SHLDR YEL COMPR LCK CAP RVS - [id number]  KIT BONE SCR L42MM DIA4.5MM KYLAH SHLDR YEL COMPR LCK CAP RVS [id number] [employer]WD NA Left 1 Implanted   SCR LCK CAP KIT 4.5X30MM KYLIE -- EQUINOXE - [id number]  SCR LCK CAP KIT 4.5X30MM KYLIE -- EQUINOXE [id number] [employer] NA Left 1 Implanted   KIT BONE SCR L42MM DIA4.5MM KYLAH SHLDR YEL COMPR LCK CAP RVS - [id number]  KIT BONE SCR L42MM DIA4.5MM KYLAH SHLDR YEL COMPR LCK CAP RVS [id number] [employer]WD NA Left 1 Implanted   SCR BNE LCK GLENOSPHERE -- EQUINOXE - [id number]  SCR BNE LCK GLENOSPHERE -- EQUINOXE [id number] [employer] NA Left 1 Implanted   KIT BONE SCR L18MM DIA4.5MM KYLAH SHLDR WHT COMPR LCK CAP RVS - [id number]  KIT BONE SCR L18MM DIA4.5MM KYLAH SHLDR WHT COMPR LCK CAP RVS [id number] [employer]WD NA Left 1 Implanted   SCR TORQUE DEFINING KIT -- EQUINOXE - [id number]  [facility] TORQUE DEFINING KIT -- Dala Race [id number] [employer] NA Left 1 Implanted   TRAY HUM ADPT REV +0 -- EQUINOXE - [id number]  TRAY HUM ADPT REV +0 -- Dala Race [id number] [employer] NA Left 1 Implanted STEM HUM [id number] SHLDR CARL PRESSFIT Jean Ortiz [id number]  STEM HUM [id number] SHLDR CARL PRESSFIT EQUINOXE [id number] [employer]WD NA Left 1 Implanted   Glenospere 38mm   [id number] [employer] NA Left 1 Implanted   LINER HUM [id number] +0MM OFFSET STD POLY FOR REV SHLDR SYS - [id number]  LINER HUM [id number] +0MM OFFSET STD POLY FOR REV SHLDR SYS [id number] [employer]WD NA Left 1 Implanted       Drains: * No LDAs found *    Findings: rotator cuff arthropathy    Electronically Signed by Elis Haider MD on 10/7/2021 at 3:36 PM

## 2021-10-08 VITALS
OXYGEN SATURATION: 96 % | WEIGHT: 244.71 LBS | HEART RATE: 99 BPM | DIASTOLIC BLOOD PRESSURE: 72 MMHG | SYSTOLIC BLOOD PRESSURE: 108 MMHG | BODY MASS INDEX: 31.41 KG/M2 | HEIGHT: 74 IN | TEMPERATURE: 99.3 F | RESPIRATION RATE: 16 BRPM

## 2021-10-08 PROCEDURE — 97535 SELF CARE MNGMENT TRAINING: CPT

## 2021-10-08 PROCEDURE — 97116 GAIT TRAINING THERAPY: CPT

## 2021-10-08 PROCEDURE — 74011250636 HC RX REV CODE- 250/636: Performed by: ORTHOPAEDIC SURGERY

## 2021-10-08 PROCEDURE — 74011250637 HC RX REV CODE- 250/637: Performed by: ORTHOPAEDIC SURGERY

## 2021-10-08 PROCEDURE — 97161 PT EVAL LOW COMPLEX 20 MIN: CPT

## 2021-10-08 PROCEDURE — 97110 THERAPEUTIC EXERCISES: CPT

## 2021-10-08 PROCEDURE — 97165 OT EVAL LOW COMPLEX 30 MIN: CPT

## 2021-10-08 PROCEDURE — 97530 THERAPEUTIC ACTIVITIES: CPT

## 2021-10-08 PROCEDURE — 74011000250 HC RX REV CODE- 250

## 2021-10-08 PROCEDURE — 74011000250 HC RX REV CODE- 250: Performed by: ORTHOPAEDIC SURGERY

## 2021-10-08 RX ORDER — OXYCODONE HYDROCHLORIDE 5 MG/1
10 TABLET ORAL
Qty: 42 TABLET | Refills: 0 | Status: SHIPPED | OUTPATIENT
Start: 2021-10-08 | End: 2021-10-11

## 2021-10-08 RX ORDER — WATER FOR INJECTION,STERILE
VIAL (ML) INJECTION
Status: COMPLETED
Start: 2021-10-08 | End: 2021-10-08

## 2021-10-08 RX ADMIN — DOCUSATE SODIUM 50 MG AND SENNOSIDES 8.6 MG 1 TABLET: 8.6; 5 TABLET, FILM COATED ORAL at 09:37

## 2021-10-08 RX ADMIN — WATER 2 G: 1 INJECTION INTRAMUSCULAR; INTRAVENOUS; SUBCUTANEOUS at 00:45

## 2021-10-08 RX ADMIN — ASPIRIN 325 MG: 325 TABLET, COATED ORAL at 09:38

## 2021-10-08 RX ADMIN — BUPROPION HYDROCHLORIDE 150 MG: 150 TABLET, EXTENDED RELEASE ORAL at 09:38

## 2021-10-08 RX ADMIN — ACETAMINOPHEN 650 MG: 325 TABLET ORAL at 07:05

## 2021-10-08 RX ADMIN — WATER 2 G: 1 INJECTION INTRAMUSCULAR; INTRAVENOUS; SUBCUTANEOUS at 09:40

## 2021-10-08 RX ADMIN — Medication 50 MG: at 09:37

## 2021-10-08 RX ADMIN — MULTIPLE VITAMINS W/ MINERALS TAB 1 TABLET: TAB at 09:37

## 2021-10-08 RX ADMIN — FLUTICASONE PROPIONATE 2 SPRAY: 50 SPRAY, METERED NASAL at 09:00

## 2021-10-08 RX ADMIN — WATER: 1 INJECTION INTRAMUSCULAR; INTRAVENOUS; SUBCUTANEOUS at 10:00

## 2021-10-08 RX ADMIN — Medication 10 ML: at 14:00

## 2021-10-08 RX ADMIN — OXYCODONE 10 MG: 5 TABLET ORAL at 00:46

## 2021-10-08 RX ADMIN — ACETAMINOPHEN 650 MG: 325 TABLET ORAL at 00:45

## 2021-10-08 RX ADMIN — OXYCODONE HYDROCHLORIDE AND ACETAMINOPHEN 500 MG: 500 TABLET ORAL at 09:38

## 2021-10-08 RX ADMIN — OXYCODONE 10 MG: 5 TABLET ORAL at 14:04

## 2021-10-08 RX ADMIN — FAMOTIDINE 20 MG: 20 TABLET ORAL at 09:39

## 2021-10-08 RX ADMIN — OXYCODONE 10 MG: 5 TABLET ORAL at 07:05

## 2021-10-08 RX ADMIN — Medication 1000 UNITS: at 09:36

## 2021-10-08 RX ADMIN — POLYETHYLENE GLYCOL 3350 17 G: 17 POWDER, FOR SOLUTION ORAL at 09:39

## 2021-10-08 RX ADMIN — OXYCODONE 10 MG: 5 TABLET ORAL at 04:03

## 2021-10-08 RX ADMIN — OXYBUTYNIN CHLORIDE 5 MG: 5 TABLET ORAL at 09:38

## 2021-10-08 RX ADMIN — FERROUS SULFATE TAB 325 MG (65 MG ELEMENTAL FE) 650 MG: 325 (65 FE) TAB at 09:36

## 2021-10-08 RX ADMIN — SODIUM CHLORIDE 125 ML/HR: 900 INJECTION, SOLUTION INTRAVENOUS at 05:11

## 2021-10-08 RX ADMIN — OXYCODONE 10 MG: 5 TABLET ORAL at 10:28

## 2021-10-08 NOTE — PROGRESS NOTES
Problem: Self Care Deficits Care Plan (Adult)  Goal: *Acute Goals and Plan of Care (Insert Text)  Description: Occupational Therapy Goals  Initiated 10/8/2021   1. Patient will complete upper body dressing with min A within 7 days. 2.  Patient will complete upper body bathing with min A within 7 days. 3.  Patient will complete lower body dressing with min A within 7 days. 4.  Patient will complete toileting with supervision within 7 days. 5.  Patient will complete toilet transfer with independence within 7 days. 6.  Patient will complete AROM for elbow/wrist/hand independently within 7 days. 7.  Patient will complete pendulum exercise program with independence within 7 days. FUNCTIONAL STATUS PRIOR TO ADMISSION: Pt is independent at home prior to surgery. He ambulates independently but has been using the cane periodically. HOME SUPPORT: Pt will have support from his wife at home. 10/8/2021 1402 by Stephania López OT  Outcome: Progressing Towards Goal     OCCUPATIONAL THERAPY EVALUATION  Patient: Eyad Waite (30 y.o. male)  Date: 10/8/2021  Primary Diagnosis: Rotator cuff arthropathy [M12.819]  Procedure(s) (LRB):  LEFT REVERSE TOTAL SHOULDER ARTHROPLASTY (Left) 1 Day Post-Op   Precautions:   Fall, WBAT    ASSESSMENT  Based on the objective data described below, the patient presents with decreased independence with self care and functional mobility following admission for L Rev. TSR. He was able to progress OOB for ADL training and education. He does have slight light headed feeling but BP stable when progressing out of bed. He was able to complete dressing activities overall at min A level and readjusted the sling for improved fit (obtained large sling and replaced the medium that was too small). He could still benefit from a larger size but we do not have it in stock in the hospital.  He will have support from his wife as needed at home.   He is independent with ADL training and education. Will follow up this pm for exercise education. Recommend PT consult due to impaired functional mobility at baseline due to tinnitus, chronic light headed feeling, and pt with general complaint of balance deficits. Discussed with charge nurse and obtained PT orders. Ancipitate pt to discharge home today. Follow up this pm for exercises. He did not complete but participated with demonstration and education for how to complete pendulum exercises for going home. He was instructed to start exercises 5 reps working towards 10 3 times daily. He reports understanding of training and education. Handout provided to patients wife. Current Level of Function Impacting Discharge (ADLs/self-care): min A     Functional Outcome Measure: The patient scored 70 on the Barthel Index outcome measure which is indicative of 30% impairment with ADl activities. Other factors to consider for discharge: debility, pain, balance deficits at baseline     Patient will benefit from skilled therapy intervention to address the above noted impairments. PLAN :  Recommendations and Planned Interventions: self care training, functional mobility training, therapeutic exercise, balance training, therapeutic activities, endurance activities, patient education, home safety training, and family training/education    Frequency/Duration: Patient will be followed by occupational therapy 5 times a week to address goals. Recommendation for discharge: (in order for the patient to meet his/her long term goals)  No skilled occupational therapy/ follow up rehabilitation needs identified at this time. This discharge recommendation:  Has been made in collaboration with the attending provider and/or case management    IF patient discharges home will need the following DME: none       SUBJECTIVE:   Patient stated I am feeling alright.     OBJECTIVE DATA SUMMARY:   HISTORY:   Past Medical History:   Diagnosis Date    Arthritis     Chronic pain     Depression with anxiety     GERD (gastroesophageal reflux disease)     Sleep apnea     ORAL APPLIANCES     Past Surgical History:   Procedure Laterality Date    HX GI      COLONOSCOPY    HX ORTHOPAEDIC Left     SHOULDER X 2    HX ORTHOPAEDIC Left     FOREARM X 2       Expanded or extensive additional review of patient history:     Home Situation  Home Environment: Private residence  # Steps to Enter: 5  Rails to Enter: Yes  Hand Rails : Right  Wheelchair Ramp: No  One/Two Story Residence: Two story, live on 1st floor  # of Interior Steps: 12  Interior Rails: Right  Lift Chair Available: No  Support Systems: Spouse/Significant Other  Patient Expects to be Discharged to[de-identified] [employer]  Current DME Used/Available at Home: Cane, straight  Tub or Shower Type: Shower    Hand dominance: Right    EXAMINATION OF PERFORMANCE DEFICITS:  Cognitive/Behavioral Status:  Neurologic State: Alert  Orientation Level: Oriented X4  Cognition: Appropriate for age attention/concentration  Perception: Appears intact  Perseveration: No perseveration noted  Safety/Judgement: Good awareness of safety precautions    Skin: see nursing notes    Edema: none noted    Hearing: Auditory  Auditory Impairment: None    Vision/Perceptual:                           Acuity: Within Defined Limits         Range of Motion:    AROM: Within functional limits (LUE s/p TSR)  PROM: Within functional limits (LUE s/p TSR)                      Strength:    Strength: Within functional limits (LUE s/p TSR)                Coordination:  Coordination: Within functional limits (LUE s/p TSR)  Fine Motor Skills-Upper: Right Intact; Left Intact    Gross Motor Skills-Upper: Right Intact; Left Impaired    Tone & Sensation:    Tone: Normal  Sensation: Impaired (neuropathy in hands and feet)                      Balance:  Sitting: Intact  Standing: Impaired; Without support  Standing - Static: Good  Standing - Dynamic : Fair    Functional Mobility and Transfers for ADLs:  Bed Mobility:  Supine to Sit: Supervision  Sit to Supine: Supervision  Scooting: Supervision    Transfers:  Sit to Stand: Supervision  Stand to Sit: Supervision  Bed to Chair: Supervision  Bathroom Mobility: Supervision/set up  Toilet Transfer : Supervision    ADL Assessment:  Feeding: Supervision    Oral Facial Hygiene/Grooming: Supervision    Bathing: Minimum assistance    Upper Body Dressing: Minimum assistance    Lower Body Dressing: Supervision    Toileting: Supervision                ADL Intervention and task modifications:     Dressing Training:  Pt received education and training for lamine dressing techniques following shoulder replacement surgery. Pt provided education for donning button down shirts as well and pull over cotton shirts. Button down shirts: educated to don surgical arm first and then to adjust over his back and don strong arm. Pt educated that he can use both hands to button shirts. T-shirt: educated pt to don over surgical arm first, then adjust over head, and then don strong arm in shirt. Educated patient that a size larger does make this a little easier for home. Overall complete upper body dressing with min A. Also received education and training for how to properly don abduction wedge sling. Adjust sling for proper comfort and educated pt how to adjust strapping for home. Cognitive Retraining  Safety/Judgement: Good awareness of safety precautions    Functional Measure:    Barthel Index:  Bathin  Bladder: 10  Bowels: 10  Groomin  Dressin  Feeding: 10  Mobility: 10  Stairs: 5  Toilet Use: 5  Transfer (Bed to Chair and Back): 10  Total: 70/100      The Barthel ADL Index: Guidelines  1. The index should be used as a record of what a patient does, not as a record of what a patient could do.   2. The main aim is to establish degree of independence from any help, physical or verbal, however minor and for whatever reason. 3. The need for supervision renders the patient not independent. 4. A patient's performance should be established using the best available evidence. Asking the patient, friends/relatives and nurses are the usual sources, but direct observation and common sense are also important. However direct testing is not needed. 5. Usually the patient's performance over the preceding 24-48 hours is important, but occasionally longer periods will be relevant. 6. Middle categories imply that the patient supplies over 50 per cent of the effort. 7. Use of aids to be independent is allowed. Score Interpretation (from [address] [facility] 83)    Independent   60-79 Minimally independent   40-59 Partially dependent   20-39 Very dependent   <20 Totally dependent     -Marybel Kline., Barthel, DMarlonW. (1965). Functional evaluation: the Barthel Index. [address] [facility] ([address] [place] [address] (1997). The Barthel activities of daily living index: self-reporting versus actual performance in the old (> or = 75 years). Journal [address] 45(7), 14 [facility], JMarlonMarlon., [facility]., [facility] Luz Elena. (1999). Measuring the change in disability after inpatient rehabilitation; comparison of the responsiveness of the Barthel Index and Functional Bannister Measure. Journal of Neurology, Neurosurgery, and Psychiatry, 66(4), 409-029. Jermaine Cain, [place].CHUCK, ROSALIA Dunne, & Freddy Marks M.A. (2004) Assessment of post-stroke quality of life in cost-effectiveness studies: The usefulness of the Barthel Index and the EuroQoL-5D.  Quality of Life Research, 15, 556-66         Occupational Therapy Evaluation Charge Determination   History Examination Decision-Making   LOW Complexity : Brief history review  HIGH Complexity : 5 or more performance deficits relating to physical, cognitive , or psychosocial skils that result in activity limitations and / or participation restrictions HIGH Complexity : Patient presents with comorbidities that affect occupational performance. Signifigant modification of tasks or assistance (eg, physical or verbal) with assessment (s) is necessary to enable patient to complete evaluation       Based on the above components, the patient evaluation is determined to be of the following complexity level: LOW   Pain Ratin/10 pain in shoulder    Activity Tolerance:   Good    After treatment patient left in no apparent distress:    Sitting in chair and Call bell within reach    COMMUNICATION/EDUCATION:   The patients plan of care was discussed with: Physical therapist, Registered nurse, and charge nurse . Home safety education was provided and the patient/caregiver indicated understanding. and Patient/family have participated as able in goal setting and plan of care. This patients plan of care is appropriate for delegation to [facility].     Thank you for this referral.  Gabriela Fournier OT  Time Calculation: 27 mins

## 2021-10-08 NOTE — PROGRESS NOTES
PHYSICAL THERAPY EVALUATION/DISCHARGE  Patient: Lisa Ramirez (48 y.o. male)  Date: 10/8/2021  Primary Diagnosis: Rotator cuff arthropathy [M12.819]  Procedure(s) (LRB):  LEFT REVERSE TOTAL SHOULDER ARTHROPLASTY (Left) 1 Day Post-Op   Precautions:   Fall, WBAT      ASSESSMENT  Based on the objective data described below, the patient presents with history of balance issues due to tinnitus/peripheral neuropathy/hypoactive vestibular system. He is a war . Patient had L TSR and PT asked to see if patient is safe to go home from mobility standpoint. Spent time discussing patient's balance issues and educating patient and wife on fall prevention to include: importance of supportive footwear, especially with decreased sensation in feet/use of night lights and well-lit rooms/effects of head turns and rapid position changes on balance organs. Patient ambulated safely using a straight cane and safely negotiated steps using right rail up, cane down and utilizing unilateral stepping. Patient has single point cane and walking stick at home. Patient is cleared for discharge from PT standpoint as he is at his baseline level of functioning. .  Functional Outcome Measure: The patient scored 18/28 on the Tinetti outcome measure which is indicative of high fall risk. .      Other factors to consider for discharge: Motivated/A & O x 4/Supportive Family/Independent PLOF     Further skilled acute physical therapy is not indicated at this time. PLAN :  Recommendation for discharge: (in order for the patient to meet his/her long term goals)  To be determined: [facility]    This discharge recommendation:  Has been made in collaboration with the attending provider and/or case management    IF patient discharges home will need the following DME: patient owns DME required for discharge       SUBJECTIVE:   Patient stated I know what makes me dizzy.     OBJECTIVE DATA SUMMARY:   HISTORY:    Past Medical History:   Diagnosis Date    Arthritis     Chronic pain     Depression with anxiety     GERD (gastroesophageal reflux disease)     Sleep apnea     ORAL APPLIANCES     Past Surgical History:   Procedure Laterality Date    HX GI      COLONOSCOPY    HX ORTHOPAEDIC Left     SHOULDER X 2    HX ORTHOPAEDIC Left     FOREARM X 2       Prior level of function: PLOF: Independent with ADLs and IADLs. Occasional use of SPC. Personal factors and/or comorbidities impacting plan of care: Motivated/A & O x 4/Supportive Family/Independent PLOF    Home Situation  Home Environment: Private residence  # Steps to Enter: 5  Rails to Enter: Yes  Hand Rails : Right  Wheelchair Ramp: No  One/Two Story Residence: Two story, live on 1st floor  # of Interior Steps: 12  Interior Rails: Right  Lift Chair Available: No  Support Systems: Spouse/Significant Other  Patient Expects to be Discharged to[de-identified] [employer]  Current DME Used/Available at Home: Cane, straight  Tub or Shower Type: Shower    EXAMINATION/PRESENTATION/DECISION MAKING:   Critical Behavior:  Neurologic State: Alert  Orientation Level: Oriented X4  Cognition: Appropriate for age attention/concentration  Safety/Judgement: Good awareness of safety precautions  Hearing: Auditory  Auditory Impairment: None    Range Of Motion:  AROM:  (LUE s/p TSR)           PROM: Within functional limits (LEs)           Strength:    Strength:  Within functional limits (LEs)                    Tone & Sensation:   Tone: Normal              Sensation: Impaired (peripheral neuropathy)               Coordination:  Coordination: Within functional limits (LEs)  Vision:   Acuity: Within Defined Limits  Functional Mobility:  Bed Mobility:     Supine to Sit: Supervision  Sit to Supine: Supervision  Scooting: Supervision  Transfers:  Sit to Stand: Supervision  Stand to Sit: Supervision        Bed to Chair: Supervision              Balance:   Sitting: Intact  Standing: Impaired; Without support  Standing - Static: Good  Standing - Dynamic : Fair  Ambulation/Gait Training:  Distance (ft): 150 Feet (ft)  Assistive Device: Cane, straight;Gait belt  Ambulation - Level of Assistance: Contact guard assistance (PT held gait belt for safety)        Gait Abnormalities: Trunk sway increased        Base of Support: Widened     Speed/Rosmery: Slow           Interventions: Safety awareness training;Verbal cues        Stairs:  Number of Stairs Trained: 4  Stairs - Level of Assistance: Supervision   Rail Use: Right         Functional Measure:  Tinetti test:    Sitting Balance: 1  Arises: 1  Attempts to Rise: 2  Immediate Standing Balance: 1  Standing Balance: 1  Nudged: 1  Eyes Closed: 0  Turn 360 Degrees - Continuous/Discontinuous: 1  Turn 360 Degrees - Steady/Unsteady: 0  Sitting Down: 2  Balance Score: 10 Balance total score  Indication of Gait: 1  R Step Length/Height: 1  L Step Length/Height: 1  R Foot Clearance: 1  L Foot Clearance: 1  Step Symmetry: 1  Step Continuity: 1  Path: 1  Trunk: 0  Walking Time: 0  Gait Score: 8 Gait total score  Total Score: 18/28 Overall total score         Tinetti Tool Score Risk of Falls  <19 = High Fall Risk  19-24 = Moderate Fall Risk  25-28 = Low Fall Risk  Tinetti ME. Performance-Oriented Assessment of Mobility Problems in Elderly Patients. [place] 66; [id number].  (Scoring Description: PT Bulletin Feb. 10, 1993)    Older adults: Dante Mena et al, 2009; n = 1000 [facility] elderly evaluated with ABC, CINDY, ADL, and IADL)  · Mean CINDY score for males aged 69-68 years = 26.21(3.40)  · Mean CINDY score for females age 69-68 years = 25.16(4.30)  · Mean CINDY score for males over 80 years = 23.29(6.02)  · Mean CINDY score for females over 80 years = 17.20(8.32)            Physical Therapy Evaluation Charge Determination   History Examination Presentation Decision-Making   MEDIUM  Complexity : 1-2 comorbidities / personal factors will impact the outcome/ POC  MEDIUM Complexity : 3 Standardized tests and measures addressing body structure, function, activity limitation and / or participation in recreation  LOW Complexity : Stable, uncomplicated  LOW Complexity : FOTO score of       Based on the above components, the patient evaluation is determined to be of the following complexity level: LOW     Pain Ratin/10    Activity Tolerance:   Good      After treatment patient left in no apparent distress:   Sitting in chair, Call bell within reach, Caregiver / family present and nurse notified. COMMUNICATION/EDUCATION:   The patients plan of care was discussed with: Occupational therapist and Registered nurse. Fall prevention education was provided and the patient/caregiver indicated understanding., Patient/family have participated as able in goal setting and plan of care. and Patient/family agree to work toward stated goals and plan of care.     Thank you for this referral.  Kaylee Nicole   Time Calculation: 45 mins

## 2021-10-08 NOTE — PROGRESS NOTES
Bedside shift change report given to RICARDO Paez (oncoming nurse) by Jeovany Warner RN (offgoing nurse). Report included the following information SBAR, Kardex, Output.

## 2021-10-08 NOTE — PROGRESS NOTES
POD 1 Day Post-Op    Procedure:  Procedure(s):  LEFT REVERSE TOTAL SHOULDER ARTHROPLASTY    Subjective:     Patient doing ok. Painfully this am    Objective:     Blood pressure 104/64, pulse 88, temperature 99.3 °F (37.4 °C), resp. rate 16, height 6' 2\" (1.88 m), weight 111 kg (244 lb 11.4 oz), SpO2 96 %. Temp (24hrs), Av.3 °F (36.8 °C), Min:97.9 °F (36.6 °C), Max:99.3 °F (37.4 °C)      Physical Exam:  Examination of the left shoulder reveals that the incision is clean, dry and intact. Sensation is intact to light touch.  mild swelling. Sensation of axillary nerve intact. Normal sensation distally. Moving digits with full power. no calf pain.   NVI    Labs:   Lab Results   Component Value Date/Time    HGB 14.5 2021 09:37 AM         Assessment:     Active Problems:    Rotator cuff arthropathy (10/7/2021)        Plan/Recommendations/Medical Decision Making:     Continue physical therapy  Ice and elevate  Home today

## 2021-10-08 NOTE — DISCHARGE INSTRUCTIONS
Postoperative Instructions    Medications/Diet    1. Eat only light, non-greasy foods today  2. Take pain medicine with food  3. While taking pain medicines, you may not operate a vehicle, heavy machinery, or appliances  4. While taking pain medicines, you may not drink alcoholic beverages  5. While taking pain medicines, you may not make critical decisions or sign legal papers  6. If you have any reactions to your medicines, stop taking them and call my office immediately  7. If you are not allergic, take one 325mg aspirin twice a day to help prevent blood clots  8. Please keep in mind that constipation is a very common side effect of taking narcotic pain medication. We recommend that patients take precautions to prevent constipation:   Drink plenty of water (6-8 glasses of 8 oz. a day)   Avoid alcohol, caffeine, and dairy products   Eat plenty of fiber (fruits, vegetables and whole grains)   Take an over the counter stool softener (colace or dulcolax)          Activity/Exercise    1. You may move the arm as directed by physical therapist  2. You may take arm out of sling and move elbow as necessary. Must wear sling while out of the house and while sleeping  3. You may change dressing daily. If no drainage, you may leave dressing off.  4. You may shower on post operative day #7  5. Please keep ice applied to the shoulder for the first 72 hours or as long as pain or swelling persist. Do not apply ice directly to skin, or allow water to leak on your dressing    Emergency/Follow-Up    1. Please notify my office at [phone] if you develop any fever (101° or above), unexpected warmth, redness or swelling in your shoulder  2. Please call if your fingers/toes become cold, purple, numb, or there is excessive bleeding  3. Please call the office within 24 hours at [phone] ([phone]) to schedule a follow up appointment next week  4.  Please call the office before 3pm on Friday if you do not have enough pain medicine for the weekend.  Narcotic pain medication cannot be called into your pharmacy and the prescription must be picked up at our office

## 2021-10-08 NOTE — PROGRESS NOTES
Discharge information reviewed with patient and his wife who was at bedside. Pain medication prescription printed and given to patient. IV line d/c with no incidents. Patient verbalized understanding of discharge instructions with no further concerns or questions at this time. Patient transported off the unit for discharge.

## 2021-10-14 NOTE — DISCHARGE SUMMARY
Discharge Summary     Patient ID:  Wu Diaz  [id number]  61 y.o.  1965    Admit Date: 10/7/2021    Discharge Date:    Admission Diagnoses: Rotator cuff arthropathy [M12.819]    Surgeon: Billye Blizzard, MD    Last Procedure: Procedure(s):  LEFT [address] Course: Normal hospital course for this procedure. Significant Diagnostic Studies: none    Discharge Diagnosis: Active Problems:    Rotator cuff arthropathy (10/7/2021)         Condition on Discharge: good    Disposition:Home    Followup Care:  Discharge Condition: good  See surgical instructions  Regular Diet  Keep wound clean and dry    Follow-up Information     Follow up With Specialties Details Why Contact Info    Tammie Deluca MD Internal Medicine, Internal Medicine   [address] [place] [address]  [phone]            [place] med list:   Discharge Medication List as of 10/8/2021  3:08 PM      START taking these medications    Details   !! oxyCODONE IR (ROXICODONE) 5 mg immediate release tablet Take 2 Tablets by mouth every four (4) hours as needed for Pain for up to 3 days. Max Daily Amount: 60 mg., Print, Disp-42 Tablet, R-0      !! oxyCODONE IR (ROXICODONE) 5 mg immediate release tablet Take 2 Tablets by mouth every four (4) hours as needed for Pain for up to 3 days. Max Daily Amount: 60 mg., Print, Disp-42 Tablet, R-0       !! - Potential duplicate medications found. Please discuss with provider. CONTINUE these medications which have NOT CHANGED    Details   ferrous sulfate (Iron) 325 mg (65 mg iron) tablet Take  by mouth Daily (before breakfast). , Historical Med      THIAMINE HCL, VITAMIN B1, PO Take 50 mg by mouth daily. , Historical Med      cholecalciferol (Vitamin D3) 25 mcg (1,000 unit) cap Take 2,000 Units by mouth daily. , Historical Med      ascorbic acid, vitamin C, (Vitamin C) 500 mg tablet Take 500 mg by mouth., Historical Med      multivitamin (ONE A DAY) tablet Take 1 Tablet by mouth daily. , Historical Med      cyanocobalamin (VITAMIN B12) 500 mcg tablet Take 500 mcg by mouth every fourty-eight (48) hours. , Historical Med      docosahexaenoic acid/epa (FISH OIL PO) Take 3,000 mg by mouth daily. , Historical Med      atorvastatin (LIPITOR) 40 mg tablet Take 40 mg by mouth nightly., Historical Med      prazosin (MINIPRESS) 2 mg capsule Take 2 mg by mouth nightly., Historical Med      buPROPion XL (WELLBUTRIN XL) 300 mg XL tablet Take 300 mg by mouth daily. , Historical Med      oxybutynin (DITROPAN) 5 mg tablet Take 5 mg by mouth daily. , Historical Med      POLYETHYLENE GLYCOL 2 Capsules by Does Not Apply route daily. , Historical Med      famotidine (PEPCID) 20 mg tablet Take 20 mg by mouth as needed for Heartburn., Historical Med      ALLOPURINOL PO Take 2 Tablets by mouth nightly., Historical Med      docusate sodium (COLACE) 100 mg capsule Take 100 mg by mouth daily. , Historical Med      calcium polycarbophiL (Fiber Laxative, ca polycarbo,) 625 mg tablet Take 1,250 mg by mouth daily. , Historical Med      semaglutide (Ozempic) 0.25 mg or 0.5 mg/dose (2 mg/1.5 ml) subq pen by SubCUTAneous route every seven (7) days. , Historical Med      fluticasone propionate (FLONASE) 50 mcg/actuation nasal spray 2 Sprays by Both Nostrils route as needed., Historical Med      OTHER ALLERGY SHOTS EVERY OTHER WEEK, Historical Med         STOP taking these medications       naproxen (Naprosyn) 500 mg tablet Comments:   Reason for Stopping:         predniSONE (DELTASONE) 5 mg tablet Comments:   Reason for Stopping:                  Home Going Instructions:   Patient to follow up in one - two weeks. Notify my office if develop fever, chills, redness, unbearble pain or temp.>102. Patient to follow discharge instructions. Patient to call [phone] to set up follow up appointment.         Signed:  Damian Iverson MD  10/14/2021  7:18 AM

## 2021-10-23 VITALS — BODY MASS INDEX: 30.42 KG/M2 | HEIGHT: 74 IN | WEIGHT: 237 LBS

## 2021-10-23 PROBLEM — M12.812 ROTATOR CUFF ARTHROPATHY, LEFT: Status: ACTIVE | Noted: 2021-10-07

## 2021-10-23 PROBLEM — M75.122 COMPLETE ROTATOR CUFF TEAR OF LEFT SHOULDER: Status: ACTIVE | Noted: 2021-10-23

## 2021-10-23 PROBLEM — M25.612 LIMITATION OF JOINT MOTION OF LEFT SHOULDER: Status: ACTIVE | Noted: 2021-10-23

## 2021-10-23 PROBLEM — M25.512 LEFT SHOULDER PAIN: Status: ACTIVE | Noted: 2021-10-23

## 2021-10-23 PROBLEM — Z96.612 S/P SHOULDER REPLACEMENT, LEFT: Status: ACTIVE | Noted: 2021-10-23

## 2021-10-23 PROBLEM — M19.012 ARTHRITIS OF LEFT SHOULDER REGION: Status: ACTIVE | Noted: 2021-10-23

## 2021-10-23 PROBLEM — R29.898 LEFT ARM WEAKNESS: Status: ACTIVE | Noted: 2021-10-23

## 2021-11-15 ENCOUNTER — OFFICE VISIT (OUTPATIENT)
Dept: ORTHOPEDIC SURGERY | Age: 56
End: 2021-11-15
Payer: MEDICARE

## 2021-11-15 DIAGNOSIS — M12.812 ROTATOR CUFF ARTHROPATHY, LEFT: ICD-10-CM

## 2021-11-15 DIAGNOSIS — Z96.612 STATUS POST REVERSE TOTAL SHOULDER REPLACEMENT, LEFT: Primary | ICD-10-CM

## 2021-11-15 PROCEDURE — G8417 CALC BMI ABV UP PARAM F/U: HCPCS | Performed by: ORTHOPAEDIC SURGERY

## 2021-11-15 PROCEDURE — G8427 DOCREV CUR MEDS BY ELIG CLIN: HCPCS | Performed by: ORTHOPAEDIC SURGERY

## 2021-11-15 PROCEDURE — 3017F COLORECTAL CA SCREEN DOC REV: CPT | Performed by: ORTHOPAEDIC SURGERY

## 2021-11-15 PROCEDURE — 99024 POSTOP FOLLOW-UP VISIT: CPT | Performed by: ORTHOPAEDIC SURGERY

## 2021-11-15 PROCEDURE — G8432 DEP SCR NOT DOC, RNG: HCPCS | Performed by: ORTHOPAEDIC SURGERY

## 2021-11-15 RX ORDER — TRAMADOL HYDROCHLORIDE 50 MG/1
50 TABLET ORAL
Qty: 30 TABLET | Refills: 0 | Status: CANCELLED | OUTPATIENT
Start: 2021-11-15 | End: 2021-11-18

## 2021-11-15 RX ORDER — TRAMADOL HYDROCHLORIDE 50 MG/1
50 TABLET ORAL
Qty: 60 TABLET | Refills: 0 | Status: SHIPPED | OUTPATIENT
Start: 2021-11-15 | End: 2021-11-18

## 2021-11-15 NOTE — PROGRESS NOTES
Bev Wick (: 1965) is a 61 y.o. male, patient, here for evaluation of the following chief complaint(s):  No chief complaint on file. HPI:    Patient returns the office now status post reverse total shoulder replacement. Patient states he is doing relatively well. He still does note some level of discomfort with movement of the shoulder. It seems to come to be intermittent in nature. He is here today with his wife. He continues to use his sling as prescribed. Allergies   Allergen Reactions    Bactrim [Sulfamethoprim] Other (comments)     FREEDOM SHANIKA SYNDROME    Sulfa (Sulfonamide Antibiotics) Other (comments)     FREEDOM SHANIKA SYNDROME       Current Outpatient Medications   Medication Sig    ferrous sulfate (Iron) 325 mg (65 mg iron) tablet Take  by mouth Daily (before breakfast).  THIAMINE HCL, VITAMIN B1, PO Take 50 mg by mouth daily.  cholecalciferol (Vitamin D3) 25 mcg (1,000 unit) cap Take 2,000 Units by mouth daily.  ascorbic acid, vitamin C, (Vitamin C) 500 mg tablet Take 500 mg by mouth.  multivitamin (ONE A DAY) tablet Take 1 Tablet by mouth daily.  cyanocobalamin (VITAMIN B12) 500 mcg tablet Take 500 mcg by mouth every fourty-eight (48) hours.  docusate sodium (COLACE) 100 mg capsule Take 100 mg by mouth daily.  calcium polycarbophiL (Fiber Laxative, ca polycarbo,) 625 mg tablet Take 1,250 mg by mouth daily.  docosahexaenoic acid/epa (FISH OIL PO) Take 3,000 mg by mouth daily.  semaglutide (Ozempic) 0.25 mg or 0.5 mg/dose (2 mg/1.5 ml) subq pen by SubCUTAneous route every seven (7) days.  atorvastatin (LIPITOR) 40 mg tablet Take 40 mg by mouth nightly.  prazosin (MINIPRESS) 2 mg capsule Take 2 mg by mouth nightly.  buPROPion XL (WELLBUTRIN XL) 300 mg XL tablet Take 300 mg by mouth daily.  oxybutynin (DITROPAN) 5 mg tablet Take 5 mg by mouth daily.     fluticasone propionate (FLONASE) 50 mcg/actuation nasal spray 2 Sprays by Both Nostrils route as needed. (Patient not taking: Reported on 10/7/2021)    OTHER ALLERGY SHOTS EVERY OTHER WEEK    POLYETHYLENE GLYCOL 2 Capsules by Does Not Apply route daily.  famotidine (PEPCID) 20 mg tablet Take 20 mg by mouth as needed for Heartburn.  ALLOPURINOL PO Take 2 Tablets by mouth nightly. No current facility-administered medications for this visit. Past Medical History:   Diagnosis Date    Arthritis     Arthritis of left shoulder region 10/23/2021    Chronic pain     Complete rotator cuff tear of left shoulder 10/23/2021    Depression with anxiety     GERD (gastroesophageal reflux disease)     Left arm weakness 10/23/2021    Left shoulder pain 10/23/2021    Limitation of joint motion of left shoulder 10/23/2021    S/P shoulder replacement, left 10/23/2021    Sleep apnea     ORAL APPLIANCES        Past Surgical History:   Procedure Laterality Date    HX GI      COLONOSCOPY    HX ORTHOPAEDIC Left     SHOULDER X 2    HX ORTHOPAEDIC Left     FOREARM X 2       Family History   Problem Relation Age of Onset    Emphysema Mother     Diabetes Mother     Arthritis-osteo Mother     No Known Problems Brother         Social History     Socioeconomic History    Marital status:      Spouse name: Not on file    Number of children: Not on file    Years of education: Not on file    Highest education level: Not on file   Occupational History    Not on file   Tobacco Use    Smoking status: Never Smoker    Smokeless tobacco: Never Used   Substance and Sexual Activity    Alcohol use:  Yes     Alcohol/week: 2.0 standard drinks     Types: 2 Cans of beer per week     Comment: monthly    Drug use: Never    Sexual activity: Not on file   Other Topics Concern    Not on file   Social History Narrative    Not on file     Social Determinants of Health     Financial Resource Strain:     Difficulty of Paying Living Expenses: Not on file   Food Insecurity:     Worried About Running Out of Food in the Last Year: Not on file    Ran Out of Food in the Last Year: Not on file   Transportation Needs:     Lack of Transportation (Medical): Not on file    Lack of Transportation (Non-Medical): Not on file   Physical Activity:     Days of Exercise per Week: Not on file    Minutes of Exercise per Session: Not on file   Stress:     Feeling of Stress : Not on file   Social Connections:     Frequency of Communication with Friends and Family: Not on file    Frequency of Social Gatherings with Friends and Family: Not on file    Attends Restoration Services: Not on file    Active Member of [address] [place] [address] [employer] or Organizations: Not on file    Attends Club or Organization Meetings: Not on file    Marital Status: Not on file   Intimate Partner Violence:     Fear of Current or Ex-Partner: Not on file    Emotionally Abused: Not on file    Physically Abused: Not on file    Sexually Abused: Not on file   Housing Stability:     Unable to Pay for Housing in the Last Year: Not on file    Number of [place] in the Last Year: Not on file    Unstable Housing in the Last Year: Not on file       Review of Systems   Constitutional: Negative. HENT: Negative. Eyes: Negative. Cardiovascular: Negative. Gastrointestinal: Negative. Endocrine: Negative. Genitourinary: Negative. Musculoskeletal: Negative. Left reverse total shoulder post op   Skin: Negative. Allergic/Immunologic: Negative. Neurological: Negative. Hematological: Negative. Psychiatric/Behavioral: Negative. All other systems reviewed and are negative. Vitals: There were no vitals taken for this visit. There is no height or weight on file to calculate BMI. Ortho Exam     Left shoulder: Incision is healed. No effusion is noted. He has about 80 degrees of forward elevation, 60 degrees on abduction and 30 degrees of external rotation. Passively he has a little bit better range of motion.   His pain is well controlled. He has no crepitation. There is no swelling noted distally. Neurovascular examination is intact. ASSESSMENT/PLAN:    Patient is doing well. At this stage, we will advance out of the sling. He is to continue with physical therapy to incorporate active range of motion. We will write a prescription for Ultram at this stage. He is to use this sparingly. Patient is to return to the office in 4 weeks.         Basilia Glover MD

## 2021-12-13 ENCOUNTER — OFFICE VISIT (OUTPATIENT)
Dept: ORTHOPEDIC SURGERY | Age: 56
End: 2021-12-13
Payer: MEDICARE

## 2021-12-13 DIAGNOSIS — Z96.612 STATUS POST REVERSE TOTAL REPLACEMENT OF LEFT SHOULDER: Primary | ICD-10-CM

## 2021-12-13 PROCEDURE — 99024 POSTOP FOLLOW-UP VISIT: CPT | Performed by: ORTHOPAEDIC SURGERY

## 2021-12-13 NOTE — PROGRESS NOTES
Henna Antony (: 1965) is a 61 y.o. male, patient, here for evaluation of the following chief complaint(s):  Shoulder Pain (shoulder post op)       HPI:    Patient presents the office today now status post reverse total shoulder replacement. He describes a slow but continue improvement with his shoulder as far as range of motion. He has been attending physical therapy on a regular basis. Allergies   Allergen Reactions    Bactrim [Sulfamethoprim] Other (comments)     FREEDOM SHANIKA SYNDROME    Sulfa (Sulfonamide Antibiotics) Other (comments)     FREEDOM SHANIKA SYNDROME       Current Outpatient Medications   Medication Sig    ferrous sulfate (Iron) 325 mg (65 mg iron) tablet Take  by mouth Daily (before breakfast).  THIAMINE HCL, VITAMIN B1, PO Take 50 mg by mouth daily.  cholecalciferol (Vitamin D3) 25 mcg (1,000 unit) cap Take 2,000 Units by mouth daily.  ascorbic acid, vitamin C, (Vitamin C) 500 mg tablet Take 500 mg by mouth.  multivitamin (ONE A DAY) tablet Take 1 Tablet by mouth daily.  cyanocobalamin (VITAMIN B12) 500 mcg tablet Take 500 mcg by mouth every fourty-eight (48) hours.  docusate sodium (COLACE) 100 mg capsule Take 100 mg by mouth daily.  calcium polycarbophiL (Fiber Laxative, ca polycarbo,) 625 mg tablet Take 1,250 mg by mouth daily.  docosahexaenoic acid/epa (FISH OIL PO) Take 3,000 mg by mouth daily.  semaglutide (Ozempic) 0.25 mg or 0.5 mg/dose (2 mg/1.5 ml) subq pen by SubCUTAneous route every seven (7) days.  atorvastatin (LIPITOR) 40 mg tablet Take 40 mg by mouth nightly.  prazosin (MINIPRESS) 2 mg capsule Take 2 mg by mouth nightly.  buPROPion XL (WELLBUTRIN XL) 300 mg XL tablet Take 300 mg by mouth daily.  oxybutynin (DITROPAN) 5 mg tablet Take 5 mg by mouth daily.  fluticasone propionate (FLONASE) 50 mcg/actuation nasal spray 2 Sprays by Both Nostrils route as needed.  (Patient not taking: Reported on 10/7/2021)    OTHER ALLERGY SHOTS EVERY OTHER WEEK    POLYETHYLENE GLYCOL 2 Capsules by Does Not Apply route daily.  famotidine (PEPCID) 20 mg tablet Take 20 mg by mouth as needed for Heartburn.  ALLOPURINOL PO Take 2 Tablets by mouth nightly. No current facility-administered medications for this visit. Past Medical History:   Diagnosis Date    Arthritis     Arthritis of left shoulder region 10/23/2021    Chronic pain     Complete rotator cuff tear of left shoulder 10/23/2021    Depression with anxiety     GERD (gastroesophageal reflux disease)     Left arm weakness 10/23/2021    Left shoulder pain 10/23/2021    Limitation of joint motion of left shoulder 10/23/2021    S/P shoulder replacement, left 10/23/2021    Sleep apnea     ORAL APPLIANCES        Past Surgical History:   Procedure Laterality Date    HX GI      COLONOSCOPY    HX ORTHOPAEDIC Left     SHOULDER X 2    HX ORTHOPAEDIC Left     FOREARM X 2       Family History   Problem Relation Age of Onset    Emphysema Mother     Diabetes Mother     OSTEOARTHRITIS Mother     No Known Problems Brother         Social History     Socioeconomic History    Marital status:      Spouse name: Not on file    Number of children: Not on file    Years of education: Not on file    Highest education level: Not on file   Occupational History    Not on file   Tobacco Use    Smoking status: Never Smoker    Smokeless tobacco: Never Used   Substance and Sexual Activity    Alcohol use:  Yes     Alcohol/week: 2.0 standard drinks     Types: 2 Cans of beer per week     Comment: monthly    Drug use: Never    Sexual activity: Not on file   Other Topics Concern    Not on file   Social History Narrative    Not on file     Social Determinants of Health     Financial Resource Strain:     Difficulty of Paying Living Expenses: Not on file   Food Insecurity:     Worried About Running Out of Food in the Last Year: Not on file    [place] of Food in the Last Year: Not on file   Transportation Needs:     Lack of Transportation (Medical): Not on file    Lack of Transportation (Non-Medical): Not on file   Physical Activity:     Days of Exercise per Week: Not on file    Minutes of Exercise per Session: Not on file   Stress:     Feeling of Stress : Not on file   Social Connections:     Frequency of Communication with Friends and Family: Not on file    Frequency of Social Gatherings with Friends and Family: Not on file    Attends Protestant Services: Not on file    Active Member of [address] [place] [address] [employer] or Organizations: Not on file    Attends Club or Organization Meetings: Not on file    Marital Status: Not on file   Intimate Partner Violence:     Fear of Current or Ex-Partner: Not on file    Emotionally Abused: Not on file    Physically Abused: Not on file    Sexually Abused: Not on file   Housing Stability:     Unable to Pay for Housing in the Last Year: Not on file    Number of [place] in the Last Year: Not on file    Unstable Housing in the Last Year: Not on file       Review of Systems   Musculoskeletal:        Left shoulder post op       Vitals: There were no vitals taken for this visit. There is no height or weight on file to calculate BMI. Ortho Exam     Left shoulder: Incision is healed. He has about 95 degrees of forward elevation, 80 degrees lateral duction and 40 degrees of external rotation. He does have pain at the extremes of motion. Neurovascular examination is intact. ASSESSMENT/PLAN:    Patient is progressing as anticipated. He only had about 30 degrees of forward elevation preoperatively. It is going to be a slow process for him.   He is to continue with physical therapy and return to the office in 4 weeks        Teodora Mccormick MD

## 2022-01-17 ENCOUNTER — OFFICE VISIT (OUTPATIENT)
Dept: ORTHOPEDIC SURGERY | Age: 57
End: 2022-01-17
Payer: MEDICARE

## 2022-01-17 DIAGNOSIS — M24.612 ARTHROFIBROSIS OF LEFT SHOULDER: Primary | ICD-10-CM

## 2022-01-17 PROCEDURE — 99212 OFFICE O/P EST SF 10 MIN: CPT | Performed by: ORTHOPAEDIC SURGERY

## 2022-01-17 PROCEDURE — G8432 DEP SCR NOT DOC, RNG: HCPCS | Performed by: ORTHOPAEDIC SURGERY

## 2022-01-17 PROCEDURE — G8417 CALC BMI ABV UP PARAM F/U: HCPCS | Performed by: ORTHOPAEDIC SURGERY

## 2022-01-17 PROCEDURE — G8427 DOCREV CUR MEDS BY ELIG CLIN: HCPCS | Performed by: ORTHOPAEDIC SURGERY

## 2022-01-17 PROCEDURE — 3017F COLORECTAL CA SCREEN DOC REV: CPT | Performed by: ORTHOPAEDIC SURGERY

## 2022-01-17 NOTE — PROGRESS NOTES
Dale Saxena (: 1965) is a 61 y.o. male, patient, here for evaluation of the following chief complaint(s):  Shoulder Pain (left shoulder pain)       HPI:    Patient presents the office today now 3 months status post reverse total shoulder placed on the left. He does continue to have some level of discomfort of the shoulder particularly during his physical therapy sessions. He continues to struggle with range of motion. He has some questions of which I believe were proposed through his pain management doctor. There is some questions about nerve damage muscle damage as possibly being the source of chronic pain    Allergies   Allergen Reactions    Bactrim [Sulfamethoprim] Other (comments)     FREEDOM SHANIKA SYNDROME    Sulfa (Sulfonamide Antibiotics) Other (comments)     FREEDOM SHANIKA SYNDROME       Current Outpatient Medications   Medication Sig    ferrous sulfate (Iron) 325 mg (65 mg iron) tablet Take  by mouth Daily (before breakfast).  THIAMINE HCL, VITAMIN B1, PO Take 50 mg by mouth daily.  cholecalciferol (Vitamin D3) 25 mcg (1,000 unit) cap Take 2,000 Units by mouth daily.  ascorbic acid, vitamin C, (Vitamin C) 500 mg tablet Take 500 mg by mouth.  multivitamin (ONE A DAY) tablet Take 1 Tablet by mouth daily.  cyanocobalamin (VITAMIN B12) 500 mcg tablet Take 500 mcg by mouth every fourty-eight (48) hours.  docusate sodium (COLACE) 100 mg capsule Take 100 mg by mouth daily.  calcium polycarbophiL (Fiber Laxative, ca polycarbo,) 625 mg tablet Take 1,250 mg by mouth daily.  docosahexaenoic acid/epa (FISH OIL PO) Take 3,000 mg by mouth daily.  semaglutide (Ozempic) 0.25 mg or 0.5 mg/dose (2 mg/1.5 ml) subq pen by SubCUTAneous route every seven (7) days.  atorvastatin (LIPITOR) 40 mg tablet Take 40 mg by mouth nightly.  prazosin (MINIPRESS) 2 mg capsule Take 2 mg by mouth nightly.  buPROPion XL (WELLBUTRIN XL) 300 mg XL tablet Take 300 mg by mouth daily.     oxybutynin (DITROPAN) 5 mg tablet Take 5 mg by mouth daily.  fluticasone propionate (FLONASE) 50 mcg/actuation nasal spray 2 Sprays by Both Nostrils route as needed. (Patient not taking: Reported on 10/7/2021)    OTHER ALLERGY SHOTS EVERY OTHER WEEK    POLYETHYLENE GLYCOL 2 Capsules by Does Not Apply route daily.  famotidine (PEPCID) 20 mg tablet Take 20 mg by mouth as needed for Heartburn.  ALLOPURINOL PO Take 2 Tablets by mouth nightly. No current facility-administered medications for this visit. Past Medical History:   Diagnosis Date    Arthritis     Arthritis of left shoulder region 10/23/2021    Chronic pain     Complete rotator cuff tear of left shoulder 10/23/2021    Depression with anxiety     GERD (gastroesophageal reflux disease)     Left arm weakness 10/23/2021    Left shoulder pain 10/23/2021    Limitation of joint motion of left shoulder 10/23/2021    S/P shoulder replacement, left 10/23/2021    Sleep apnea     ORAL APPLIANCES        Past Surgical History:   Procedure Laterality Date    HX GI      COLONOSCOPY    HX ORTHOPAEDIC Left     SHOULDER X 2    HX ORTHOPAEDIC Left     FOREARM X 2       Family History   Problem Relation Age of Onset    Emphysema Mother     Diabetes Mother     OSTEOARTHRITIS Mother     No Known Problems Brother         Social History     Socioeconomic History    Marital status:      Spouse name: Not on file    Number of children: Not on file    Years of education: Not on file    Highest education level: Not on file   Occupational History    Not on file   Tobacco Use    Smoking status: Never Smoker    Smokeless tobacco: Never Used   Substance and Sexual Activity    Alcohol use:  Yes     Alcohol/week: 2.0 standard drinks     Types: 2 Cans of beer per week     Comment: monthly    Drug use: Never    Sexual activity: Not on file   Other Topics Concern    Not on file   Social History Narrative    Not on file     Social Determinants of Health     Financial Resource Strain:     Difficulty of Paying Living Expenses: Not on file   Food Insecurity:     Worried About Running Out of Food in the Last Year: Not on file    Rom[place] of Food in the Last Year: Not on file   Transportation Needs:     Lack of Transportation (Medical): Not on file    Lack of Transportation (Non-Medical): Not on file   Physical Activity:     Days of Exercise per Week: Not on file    Minutes of Exercise per Session: Not on file   Stress:     Feeling of Stress : Not on file   Social Connections:     Frequency of Communication with Friends and Family: Not on file    Frequency of Social Gatherings with Friends and Family: Not on file    Attends Mor[employer] Services: Not on file    Active Member of Clubs or Organizations: Not on file    Attends Club or Organization Meetings: Not on file    Marital Status: Not on file   Intimate Partner Violence:     Fear of Current or Ex-Partner: Not on file    Emotionally Abused: Not on file    Physically Abused: Not on file    Sexually Abused: Not on file   Housing Stability:     Unable to Pay for Housing in the Last Year: Not on file    Number of [place] in the Last Year: Not on file    Unstable Housing in the Last Year: Not on file       Review of Systems   Musculoskeletal:        Left shoulder pain       Vitals: There were no vitals taken for this visit. There is no height or weight on file to calculate BMI. Ortho Exam     Left shoulder: Incision is healed. Patient has normal sensation across the lateral deltoid. He has no atrophy deltoid. Patient has 65 degrees of forward elevation, 50 degrees lateral duction and 40 degrees of external rotation. He continues to have decreased strength in all planes of motion. Internal rotation is to his SI joint. He has no crepitation with manipulation of the shoulder. Passively, he has a very similar loss of range of motion particularly with forward elevation.   His pain is mostly noted at the extremes of motion. Neurovascular examination is otherwise intact    ASSESSMENT/PLAN:    Patient is status post reverse total shoulder replacement. Prior to this, he had 2 prior surgical procedures. I do not detect any neurological cause of his pain. In regards to his muscle injury, the patient does have chronic muscle damage. He has had recurrent massive rotator cuff tear which failed to heal.  That is permanent. However, this is not the cause of his current pain nor the cause of his lack of range of motion. This is secondary to arthritis of the shoulder which is most likely secondary to 2 prior surgical procedures. I do not believe any further surgery is indicated or necessary and he and his wife both agree. I would recommend continuing with physical therapy to advance passive range of motion. I do believe as he improves passive range of motion is active range of motion and strength will follow suit. Patient agrees.   He is to return to the office in the next 6 to 8 weeks        Katerine Jimenez MD

## 2022-02-24 DIAGNOSIS — Z96.612 STATUS POST REVERSE TOTAL REPLACEMENT OF LEFT SHOULDER: Primary | ICD-10-CM

## 2022-02-24 RX ORDER — LIDOCAINE 50 MG/G
1 PATCH TOPICAL EVERY 24 HOURS
Qty: 1 EACH | Refills: 1 | Status: SHIPPED | OUTPATIENT
Start: 2022-02-24

## 2022-03-16 ENCOUNTER — OFFICE VISIT (OUTPATIENT)
Dept: ORTHOPEDIC SURGERY | Age: 57
End: 2022-03-16
Payer: MEDICARE

## 2022-03-16 DIAGNOSIS — M24.512 CONTRACTURE OF LEFT SHOULDER: Primary | ICD-10-CM

## 2022-03-16 DIAGNOSIS — Z98.890 HISTORY OF REVERSE TOTAL REPLACEMENT OF LEFT SHOULDER JOINT: ICD-10-CM

## 2022-03-16 PROCEDURE — G8427 DOCREV CUR MEDS BY ELIG CLIN: HCPCS | Performed by: ORTHOPAEDIC SURGERY

## 2022-03-16 PROCEDURE — G8417 CALC BMI ABV UP PARAM F/U: HCPCS | Performed by: ORTHOPAEDIC SURGERY

## 2022-03-16 PROCEDURE — 3017F COLORECTAL CA SCREEN DOC REV: CPT | Performed by: ORTHOPAEDIC SURGERY

## 2022-03-16 PROCEDURE — G8432 DEP SCR NOT DOC, RNG: HCPCS | Performed by: ORTHOPAEDIC SURGERY

## 2022-03-16 PROCEDURE — 99213 OFFICE O/P EST LOW 20 MIN: CPT | Performed by: ORTHOPAEDIC SURGERY

## 2022-03-16 RX ORDER — AMOXICILLIN 500 MG/1
CAPSULE ORAL
Qty: 4 CAPSULE | Refills: 4 | Status: SHIPPED | OUTPATIENT
Start: 2022-03-16

## 2022-03-16 NOTE — LETTER
3/16/2022    Patient: Marya Dillon   YOB: 1965   Date of Visit: 3/16/2022     Dominique Camejo MD  [address] [place] [id number]  Via Fax: [phone]    Dear Dominique Camejo MD,      Thank you for referring Mr. Geovanni Maynard to [facility] for evaluation. My notes for this consultation are attached. If you have questions, please do not hesitate to call me. I look forward to following your patient along with you.       Sincerely,    Rosi Josue MD

## 2022-03-16 NOTE — PROGRESS NOTES
Hunter Garcia (: 1965) is a 61 y.o. male, patient, here for evaluation of the following chief complaint(s):  Shoulder Pain (shoulder pain)       HPI:    Patient returns to the office now status post reverse total shoulder replacement. Patient states he is doing about the same as he was since seen last.  Since this time, he has been to the [address] [place] [address]. He sounds as if he is being entered into a protocol for a nerve stimulator. He is describing discomfort that is mostly located along the anterior aspect of the shoulder. He does note occasional discomfort that radiates down to the deltoid area. He denies numbness or tingling. He states he went to the  [facility] and recently had an x-ray performed on the left shoulder. Patient states the physician order the x-ray told him that everything looked good and there was no evidence of looseness of the joint    Allergies   Allergen Reactions    Bactrim [Sulfamethoprim] Other (comments)     FREEDOM SHANIKA SYNDROME    Sulfa (Sulfonamide Antibiotics) Other (comments)     FREEDOM SHANIKA SYNDROME       Current Outpatient Medications   Medication Sig    lidocaine (LIDODERM) 5 % 1 Patch by TransDERmal route every twenty-four (24) hours. Apply patch to the affected area for 12 hours a day and remove for 12 hours a day.  ferrous sulfate (Iron) 325 mg (65 mg iron) tablet Take  by mouth Daily (before breakfast).  THIAMINE HCL, VITAMIN B1, PO Take 50 mg by mouth daily.  cholecalciferol (Vitamin D3) 25 mcg (1,000 unit) cap Take 2,000 Units by mouth daily.  ascorbic acid, vitamin C, (Vitamin C) 500 mg tablet Take 500 mg by mouth.  multivitamin (ONE A DAY) tablet Take 1 Tablet by mouth daily.  cyanocobalamin (VITAMIN B12) 500 mcg tablet Take 500 mcg by mouth every fourty-eight (48) hours.  docusate sodium (COLACE) 100 mg capsule Take 100 mg by mouth daily.  calcium polycarbophiL (Fiber Laxative, ca polycarbo,) 625 mg tablet Take 1,250 mg by mouth daily.     docosahexaenoic acid/epa (FISH OIL PO) Take 3,000 mg by mouth daily.  semaglutide (Ozempic) 0.25 mg or 0.5 mg/dose (2 mg/1.5 ml) subq pen by SubCUTAneous route every seven (7) days.  atorvastatin (LIPITOR) 40 mg tablet Take 40 mg by mouth nightly.  prazosin (MINIPRESS) 2 mg capsule Take 2 mg by mouth nightly.  buPROPion XL (WELLBUTRIN XL) 300 mg XL tablet Take 300 mg by mouth daily.  oxybutynin (DITROPAN) 5 mg tablet Take 5 mg by mouth daily.  fluticasone propionate (FLONASE) 50 mcg/actuation nasal spray 2 Sprays by Both Nostrils route as needed. (Patient not taking: Reported on 10/7/2021)    OTHER ALLERGY SHOTS EVERY OTHER WEEK    POLYETHYLENE GLYCOL 2 Capsules by Does Not Apply route daily.  famotidine (PEPCID) 20 mg tablet Take 20 mg by mouth as needed for Heartburn.  ALLOPURINOL PO Take 2 Tablets by mouth nightly. No current facility-administered medications for this visit.        Past Medical History:   Diagnosis Date    Arthritis     Arthritis of left shoulder region 10/23/2021    Chronic pain     Complete rotator cuff tear of left shoulder 10/23/2021    Depression with anxiety     GERD (gastroesophageal reflux disease)     Left arm weakness 10/23/2021    Left shoulder pain 10/23/2021    Limitation of joint motion of left shoulder 10/23/2021    S/P shoulder replacement, left 10/23/2021    Sleep apnea     ORAL APPLIANCES        Past Surgical History:   Procedure Laterality Date    HX GI      COLONOSCOPY    HX ORTHOPAEDIC Left     SHOULDER X 2    HX ORTHOPAEDIC Left     FOREARM X 2       Family History   Problem Relation Age of Onset    Emphysema Mother     Diabetes Mother     OSTEOARTHRITIS Mother     No Known Problems Brother         Social History     Socioeconomic History    Marital status:      Spouse name: Not on file    Number of children: Not on file    Years of education: Not on file    Highest education level: Not on file   Occupational History  Not on file   Tobacco Use    Smoking status: Never Smoker    Smokeless tobacco: Never Used   Substance and Sexual Activity    Alcohol use: Yes     Alcohol/week: 2.0 standard drinks     Types: 2 Cans of beer per week     Comment: monthly    Drug use: Never    Sexual activity: Not on file   Other Topics Concern    Not on file   Social History Narrative    Not on file     Social Determinants of Health     Financial Resource Strain:     Difficulty of Paying Living Expenses: Not on file   Food Insecurity:     Worried About Running Out of Food in the Last Year: Not on file    Romina of Food in the Last Year: Not on file   Transportation Needs:     Lack of Transportation (Medical): Not on file    Lack of Transportation (Non-Medical): Not on file   Physical Activity:     Days of Exercise per Week: Not on file    Minutes of Exercise per Session: Not on file   Stress:     Feeling of Stress : Not on file   Social Connections:     Frequency of Communication with Friends and Family: Not on file    Frequency of Social Gatherings with Friends and Family: Not on file    Attends Tenriism Services: Not on file    Active Member of [address] [place] [address] or Organizations: Not on file    Attends Club or Organization Meetings: Not on file    Marital Status: Not on file   Intimate Partner Violence:     Fear of Current or Ex-Partner: Not on file    Emotionally Abused: Not on file    Physically Abused: Not on file    Sexually Abused: Not on file   Housing Stability:     Unable to Pay for Housing in the Last Year: Not on file    Number of [place] in the Last Year: Not on file    Unstable Housing in the Last Year: Not on file       Review of Systems   Musculoskeletal:        Shoulder pain       Vitals: There were no vitals taken for this visit. There is no height or weight on file to calculate BMI. Ortho Exam     Left shoulder: Incision is healed.   He still has limited range of motion where he has approximately 80 degrees of forward elevation, 60 degrees lateral abduction and 40 degrees of external rotation. He has a very similar level of loss of passive range of motion as well. His pain is really only noted at the extremes of flexion. His pain is also not directly located along the lateral aspect of the deltoid region. Neurovascular examination is intact. ASSESSMENT/PLAN:    We did not proceed with a repeat x-ray given the fact that he just had an x-ray. We did talk about further surgery for release of scar tissue. This would be very unpredictable and the patient is not interested in this at all. He is scheduled to receive a neural stimulator. Perhaps this would help with some of the pain. I have encouraged him to continue with physical therapy. We will prescribe him antibiotic for dental prophylaxis. If he develops any unusual pain he is to call and let me know.   Otherwise, he is to return to the office in 6 months for repeat x-ray        Morris Nunez MD

## 2022-03-18 PROBLEM — M19.012 ARTHRITIS OF LEFT SHOULDER REGION: Status: ACTIVE | Noted: 2021-10-23

## 2022-03-19 PROBLEM — R29.898 LEFT ARM WEAKNESS: Status: ACTIVE | Noted: 2021-10-23

## 2022-03-19 PROBLEM — M12.812 ROTATOR CUFF ARTHROPATHY, LEFT: Status: ACTIVE | Noted: 2021-10-07

## 2022-03-19 PROBLEM — Z96.612 S/P SHOULDER REPLACEMENT, LEFT: Status: ACTIVE | Noted: 2021-10-23

## 2022-03-19 PROBLEM — M25.612 LIMITATION OF JOINT MOTION OF LEFT SHOULDER: Status: ACTIVE | Noted: 2021-10-23

## 2022-03-19 PROBLEM — M75.122 COMPLETE ROTATOR CUFF TEAR OF LEFT SHOULDER: Status: ACTIVE | Noted: 2021-10-23

## 2022-03-19 PROBLEM — M25.512 LEFT SHOULDER PAIN: Status: ACTIVE | Noted: 2021-10-23

## 2022-09-26 ENCOUNTER — OFFICE VISIT (OUTPATIENT)
Dept: ORTHOPEDIC SURGERY | Age: 57
End: 2022-09-26
Payer: MEDICARE

## 2022-09-26 VITALS — BODY MASS INDEX: 31.32 KG/M2 | WEIGHT: 244 LBS | HEIGHT: 74 IN

## 2022-09-26 DIAGNOSIS — M25.512 CHRONIC LEFT SHOULDER PAIN: Primary | ICD-10-CM

## 2022-09-26 DIAGNOSIS — G89.29 CHRONIC LEFT SHOULDER PAIN: Primary | ICD-10-CM

## 2022-09-26 PROCEDURE — 3017F COLORECTAL CA SCREEN DOC REV: CPT | Performed by: ORTHOPAEDIC SURGERY

## 2022-09-26 PROCEDURE — 99214 OFFICE O/P EST MOD 30 MIN: CPT | Performed by: ORTHOPAEDIC SURGERY

## 2022-09-26 PROCEDURE — G8417 CALC BMI ABV UP PARAM F/U: HCPCS | Performed by: ORTHOPAEDIC SURGERY

## 2022-09-26 PROCEDURE — G8427 DOCREV CUR MEDS BY ELIG CLIN: HCPCS | Performed by: ORTHOPAEDIC SURGERY

## 2022-09-26 PROCEDURE — G8432 DEP SCR NOT DOC, RNG: HCPCS | Performed by: ORTHOPAEDIC SURGERY

## 2022-09-26 NOTE — PROGRESS NOTES
Joi Yoon (: 1965) is a 61 y.o. male, patient, here for evaluation of the following chief complaint(s):  Shoulder Pain (Left shoulder pain )       HPI:    Patient presents status post left total shoulder which she has had significant stiffness and pain from. He has used a number of pain management modalities including acupuncture, nerve stimulator as well as ketamine infusion therapy. He states that he has gotten minimal relief from these modalities. He does believe that his range of motion has improved since the last time we have seen him, pain has not however. Allergies   Allergen Reactions    Bactrim [Sulfamethoprim] Other (comments)     FREEDOM SHANIKA SYNDROME    Sulfa (Sulfonamide Antibiotics) Other (comments)     FREEDOM SHANIKA SYNDROME       Current Outpatient Medications   Medication Sig    amoxicillin (AMOXIL) 500 mg capsule Take 1 capsule 1 hour before dental treatment and 1 capsule 1 hour after dental treatment    lidocaine (LIDODERM) 5 % 1 Patch by TransDERmal route every twenty-four (24) hours. Apply patch to the affected area for 12 hours a day and remove for 12 hours a day. ferrous sulfate (Iron) 325 mg (65 mg iron) tablet Take  by mouth Daily (before breakfast). THIAMINE HCL, VITAMIN B1, PO Take 50 mg by mouth daily. cholecalciferol (Vitamin D3) 25 mcg (1,000 unit) cap Take 2,000 Units by mouth daily. ascorbic acid, vitamin C, (Vitamin C) 500 mg tablet Take 500 mg by mouth.    multivitamin (ONE A DAY) tablet Take 1 Tablet by mouth daily. cyanocobalamin (VITAMIN B12) 500 mcg tablet Take 500 mcg by mouth every fourty-eight (48) hours. docusate sodium (COLACE) 100 mg capsule Take 100 mg by mouth daily. calcium polycarbophiL (Fiber Laxative, ca polycarbo,) 625 mg tablet Take 1,250 mg by mouth daily. docosahexaenoic acid/epa (FISH OIL PO) Take 3,000 mg by mouth daily.     semaglutide (Ozempic) 0.25 mg or 0.5 mg/dose (2 mg/1.5 ml) subq pen by SubCUTAneous route every seven (7) days. atorvastatin (LIPITOR) 40 mg tablet Take 40 mg by mouth nightly. prazosin (MINIPRESS) 2 mg capsule Take 2 mg by mouth nightly. buPROPion XL (WELLBUTRIN XL) 300 mg XL tablet Take 300 mg by mouth daily. oxybutynin (DITROPAN) 5 mg tablet Take 5 mg by mouth daily. fluticasone propionate (FLONASE) 50 mcg/actuation nasal spray 2 Sprays by Both Nostrils route as needed. (Patient not taking: Reported on 10/7/2021)    OTHER ALLERGY SHOTS EVERY OTHER WEEK    POLYETHYLENE GLYCOL 2 Capsules by Does Not Apply route daily. famotidine (PEPCID) 20 mg tablet Take 20 mg by mouth as needed for Heartburn. ALLOPURINOL PO Take 2 Tablets by mouth nightly. No current facility-administered medications for this visit. Past Medical History:   Diagnosis Date    Arthritis     Arthritis of left shoulder region 10/23/2021    Chronic pain     Complete rotator cuff tear of left shoulder 10/23/2021    Depression with anxiety     GERD (gastroesophageal reflux disease)     Left arm weakness 10/23/2021    Left shoulder pain 10/23/2021    Limitation of joint motion of left shoulder 10/23/2021    S/P shoulder replacement, left 10/23/2021    Sleep apnea     ORAL APPLIANCES        Past Surgical History:   Procedure Laterality Date    HX GI      COLONOSCOPY    HX ORTHOPAEDIC Left     SHOULDER X 2    HX ORTHOPAEDIC Left     FOREARM X 2       Family History   Problem Relation Age of Onset    Emphysema Mother     Diabetes Mother     OSTEOARTHRITIS Mother     No Known Problems Brother         Social History     Socioeconomic History    Marital status:      Spouse name: Not on file    Number of children: Not on file    Years of education: Not on file    Highest education level: Not on file   Occupational History    Not on file   Tobacco Use    Smoking status: Never    Smokeless tobacco: Never   Substance and Sexual Activity    Alcohol use:  Yes     Alcohol/week: 2.0 standard drinks     Types: 2 Cans of beer per week     Comment: monthly    Drug use: Never    Sexual activity: Not on file   Other Topics Concern    Not on file   Social History Narrative    Not on file     Social Determinants of Health     Financial Resource Strain: Not on file   Food Insecurity: Not on file   Transportation Needs: Not on file   Physical Activity: Not on file   Stress: Not on file   Social Connections: Not on file   Intimate Partner Violence: Not on file   Housing Stability: Not on file       Review of Systems   Musculoskeletal:         Left shoulder      Vitals:  Ht 6' 2\" (1.88 m)   Wt 244 lb (110.7 kg)   BMI 31.33 kg/m²    Body mass index is 31.33 kg/m². Ortho Exam     Patient is alert and oriented x3. Patient is in no acute distress. Patient ambulates with a nonantalgic gait. Left shoulder: Surgical incision is healed well. Today in office he demonstrates approximately 100 degrees of forward elevation, 80 degrees of lateral abduction, 45 degrees of external rotation. Passively I am able to get him to about 110 degrees and about 50 degrees of external rotation. The limitations of range of motion are painful for him. Neurovascularly intact distally. Right shoulder: Patient has near full range of motion of the shoulder. He has no pain through the superior arc of motion. Rotator cuff strength is maintained with forward elevation, lateral abduction and external rotation. Neurovascular examination is intact. X-rays of the left shoulder show prior reverse total shoulder arthroplasty with implants in good alignment. There is no loosening noted in the implants. There is small spur inferiorly off the glenoid. XR Results (most recent):  Results from Appointment encounter on 09/26/22    XR SHOULDER LT AP/LAT MIN 2 V    Narrative  Three-view x-ray of the left shoulder reveals a well-seated prosthesis no lucencies identified.            ASSESSMENT/PLAN:      Patient is 1 year out from reverse total shoulder replacement. When I saw him last, he still had a fair amount of tightness of the shoulder. His tightness is actually improved. Unfortunately, he continues to have pain. We have discussed other causes of pain. I would suggest proceeding with an EMG test.  Whether or not there is a neurological cause of pain it would be reasonable to test this with an EMG. The EMG does not show any isolated area of neurological event, it is doubtful he will have much improvement of his pain. He is to return to the office after the EMG.       Krystal Hernandez MD

## 2022-09-26 NOTE — LETTER
9/26/2022    Patient: Donna Quinn   YOB: 1965   Date of Visit: 9/26/2022     Cat Sorenson MD  [address] [place] [id number]  Via Fax: [phone]    Dear Cat Sorenson MD,      Thank you for referring Mr. Tyson Bobby to [facility] for evaluation. My notes for this consultation are attached. If you have questions, please do not hesitate to call me. I look forward to following your patient along with you.       Sincerely,    Noam Berman MD

## 2022-09-27 DIAGNOSIS — M24.512 CONTRACTURE OF LEFT SHOULDER: Primary | ICD-10-CM

## 2023-04-19 ENCOUNTER — OFFICE VISIT (OUTPATIENT)
Dept: ORTHOPEDIC SURGERY | Age: 58
End: 2023-04-19
Payer: MEDICARE

## 2023-04-19 VITALS — HEIGHT: 74 IN | WEIGHT: 248.5 LBS | BODY MASS INDEX: 31.89 KG/M2

## 2023-04-19 DIAGNOSIS — M25.551 BILATERAL HIP PAIN: Primary | ICD-10-CM

## 2023-04-19 DIAGNOSIS — M25.552 BILATERAL HIP PAIN: Primary | ICD-10-CM

## 2023-04-19 PROCEDURE — 3017F COLORECTAL CA SCREEN DOC REV: CPT | Performed by: ORTHOPAEDIC SURGERY

## 2023-04-19 PROCEDURE — 99213 OFFICE O/P EST LOW 20 MIN: CPT | Performed by: ORTHOPAEDIC SURGERY

## 2023-04-19 PROCEDURE — G8427 DOCREV CUR MEDS BY ELIG CLIN: HCPCS | Performed by: ORTHOPAEDIC SURGERY

## 2023-04-19 PROCEDURE — G8432 DEP SCR NOT DOC, RNG: HCPCS | Performed by: ORTHOPAEDIC SURGERY

## 2023-04-19 PROCEDURE — G8417 CALC BMI ABV UP PARAM F/U: HCPCS | Performed by: ORTHOPAEDIC SURGERY

## 2023-04-19 NOTE — PROGRESS NOTES
Joanna Rose (: 1965) is a 60 y.o. male, patient, here for evaluation of the following chief complaint(s):  Hip Pain (Bilateral hip pain - referred by Dr. Arian Ham (no visit with Dr. Luciana Hughes started in right hip 2022, left hip pain started 2023/Cortisone and nerve block in right hip but did not provide relief /No physical therapy, no injury or fall reported )       HPI:    Chief complaint is bilateral hip pain. Feels it in his groins. Very intermittent. Sounds like he has some mechanical symptoms as well. Wanted to have his hip checked by an outside orthopedist.  Pocahontas Rom most of his care at the [facility] but has not seen anyone for his hips yet. Exensive history of lumbar disease. Multiple injections and other procedures on lumbar spine. Allergies   Allergen Reactions    Bactrim [Sulfamethoprim] Other (comments)     FREEDOM SHANIKA SYNDROME    Sulfa (Sulfonamide Antibiotics) Other (comments)     FREEDOM SHANIKA SYNDROME       Current Outpatient Medications   Medication Sig    amoxicillin (AMOXIL) 500 mg capsule Take 1 capsule 1 hour before dental treatment and 1 capsule 1 hour after dental treatment    lidocaine (LIDODERM) 5 % 1 Patch by TransDERmal route every twenty-four (24) hours. Apply patch to the affected area for 12 hours a day and remove for 12 hours a day. ferrous sulfate (Iron) 325 mg (65 mg iron) tablet Take  by mouth Daily (before breakfast). THIAMINE HCL, VITAMIN B1, PO Take 50 mg by mouth daily. cholecalciferol (Vitamin D3) 25 mcg (1,000 unit) cap Take 2,000 Units by mouth daily. ascorbic acid, vitamin C, (Vitamin C) 500 mg tablet Take 500 mg by mouth.    multivitamin (ONE A DAY) tablet Take 1 Tablet by mouth daily. cyanocobalamin (VITAMIN B12) 500 mcg tablet Take 500 mcg by mouth every fourty-eight (48) hours. docusate sodium (COLACE) 100 mg capsule Take 100 mg by mouth daily.     calcium polycarbophiL (Fiber Laxative, ca polycarbo,) 625 mg tablet Take 1,250 mg by mouth daily. docosahexaenoic acid/epa (FISH OIL PO) Take 3,000 mg by mouth daily. semaglutide (Ozempic) 0.25 mg or 0.5 mg/dose (2 mg/1.5 ml) subq pen by SubCUTAneous route every seven (7) days. atorvastatin (LIPITOR) 40 mg tablet Take 40 mg by mouth nightly. prazosin (MINIPRESS) 2 mg capsule Take 2 mg by mouth nightly. buPROPion XL (WELLBUTRIN XL) 300 mg XL tablet Take 300 mg by mouth daily. oxybutynin (DITROPAN) 5 mg tablet Take 5 mg by mouth daily. fluticasone propionate (FLONASE) 50 mcg/actuation nasal spray 2 Sprays by Both Nostrils route as needed. (Patient not taking: Reported on 10/7/2021)    OTHER ALLERGY SHOTS EVERY OTHER WEEK    POLYETHYLENE GLYCOL 2 Capsules by Does Not Apply route daily. famotidine (PEPCID) 20 mg tablet Take 20 mg by mouth as needed for Heartburn. ALLOPURINOL PO Take 2 Tablets by mouth nightly. No current facility-administered medications for this visit.        Past Medical History:   Diagnosis Date    Arthritis     Arthritis of left shoulder region 10/23/2021    Chronic pain     Complete rotator cuff tear of left shoulder 10/23/2021    Depression with anxiety     GERD (gastroesophageal reflux disease)     Left arm weakness 10/23/2021    Left shoulder pain 10/23/2021    Limitation of joint motion of left shoulder 10/23/2021    S/P shoulder replacement, left 10/23/2021    Sleep apnea     ORAL APPLIANCES        Past Surgical History:   Procedure Laterality Date    HX GI      COLONOSCOPY    HX ORTHOPAEDIC Left     SHOULDER X 2    HX ORTHOPAEDIC Left     FOREARM X 2       Family History   Problem Relation Age of Onset    Emphysema Mother     Diabetes Mother     OSTEOARTHRITIS Mother     No Known Problems Brother         Social History     Socioeconomic History    Marital status:      Spouse name: Not on file    Number of children: Not on file    Years of education: Not on file    Highest education level: Not on file   Occupational History Not on file   Tobacco Use    Smoking status: Never     Passive exposure: Past    Smokeless tobacco: Never   Vaping Use    Vaping Use: Never used   Substance and Sexual Activity    Alcohol use: Yes     Alcohol/week: 2.0 standard drinks     Types: 2 Cans of beer per week     Comment: monthly    Drug use: Never    Sexual activity: Not on file   Other Topics Concern    Not on file   Social History Narrative    Not on file     Social Determinants of Health     Financial Resource Strain: Not on file   Food Insecurity: Not on file   Transportation Needs: Not on file   Physical Activity: Not on file   Stress: Not on file   Social Connections: Not on file   Intimate Partner Violence: Not on file   Housing Stability: Not on file       ROS    Positive for: Musculoskeletal  Last edited by Adrian Gonzalez on 4/19/2023  1:34 PM.            Vitals:  Ht 6' 2\" (1.88 m)   Wt 248 lb 8 oz (112.7 kg)   BMI 31.91 kg/m²    Body mass index is 31.91 kg/m². PHYSICAL EXAM:  On exam today both hips are examined. Both hips are painless. Logroll test negative. Impingement test negative. Both hips extend fully and flex to over 90 degrees. Walks normally. No limp. No abductor weakness or hip flexor weakness. IMAGING:  XR Results (most recent):  Results from Appointment encounter on 04/19/23    XR HIPS BI W OR WO AP PELV    Narrative  Patient bilateral hips were x-rayed. The hip joint spaces are reasonably well-maintained. He does have bilateral head neck junction osteophytes consistent with the underlying diagnosis of cam type femoral acetabular impingement. Some inferior osteophytes are present both hips. He is tonus stage I-II. ASSESSMENT/PLAN:  1. Bilateral hip pain  -     XR HIPS BI W OR WO AP PELV; Future    Patient has relatively normal hips for his age. I would not recommend any interventions on the hips at this point time.   Would recommend he follow-up with a spine surgeon to review his MRI and see if there is anything else that can be done for him. An electronic signature was used to authenticate this note.   --Roger Casper MD

## 2023-05-23 RX ORDER — PRAZOSIN HYDROCHLORIDE 2 MG/1
2 CAPSULE ORAL NIGHTLY
COMMUNITY

## 2023-05-23 RX ORDER — AMOXICILLIN 500 MG/1
CAPSULE ORAL
COMMUNITY
Start: 2022-03-16

## 2023-05-23 RX ORDER — FAMOTIDINE 20 MG/1
20 TABLET, FILM COATED ORAL PRN
COMMUNITY

## 2023-05-23 RX ORDER — LIDOCAINE 50 MG/G
1 PATCH TOPICAL EVERY 24 HOURS
COMMUNITY
Start: 2022-02-24

## 2023-05-23 RX ORDER — ASCORBIC ACID 500 MG
500 TABLET ORAL
COMMUNITY

## 2023-05-23 RX ORDER — FERROUS SULFATE 325(65) MG
TABLET ORAL
COMMUNITY

## 2023-05-23 RX ORDER — FLUTICASONE PROPIONATE 50 MCG
2 SPRAY, SUSPENSION (ML) NASAL PRN
COMMUNITY

## 2023-05-23 RX ORDER — BUPROPION HYDROCHLORIDE 300 MG/1
300 TABLET ORAL DAILY
COMMUNITY

## 2023-05-23 RX ORDER — OXYBUTYNIN CHLORIDE 5 MG/1
5 TABLET ORAL DAILY
COMMUNITY

## 2023-05-23 RX ORDER — SEMAGLUTIDE 1.34 MG/ML
INJECTION, SOLUTION SUBCUTANEOUS
COMMUNITY

## 2023-05-23 RX ORDER — PSEUDOEPHEDRINE HCL 30 MG
100 TABLET ORAL DAILY
COMMUNITY

## 2023-05-23 RX ORDER — CALCIUM POLYCARBOPHIL 625 MG
1250 TABLET ORAL DAILY
COMMUNITY

## 2023-05-23 RX ORDER — ATORVASTATIN CALCIUM 40 MG/1
40 TABLET, FILM COATED ORAL NIGHTLY
COMMUNITY

## 2024-09-18 NOTE — OP NOTES
[facility]  OPERATIVE REPORT    Name:  Les Huynh  MR#:  [id number]  :  1965  ACCOUNT #:  [de-identified]  DATE OF SERVICE:  10/07/2021      PREOPERATIVE DIAGNOSES:  1. Rotator cuff arthropathy, left shoulder. 2.  Failed rotator cuff repair, left shoulder. POSTOPERATIVE DIAGNOSES:  1. Rotator cuff arthropathy, left shoulder. 2.  Failed rotator cuff repair, left shoulder. 3.  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 PROCEDURES PERFORMED:  1. Reverse total shoulder replacement, left shoulder. 2.  Open biceps tenodesis, left shoulder. SURGEON:  Jennifer Tyler MD    ASSISTANT:  Tech. ANESTHESIA:  General with an interscalene block. COMPLICATIONS:  Zero. SPECIMENS REMOVED:  Bone, discarded. IMPLANTS:  [employer] Equinoxe Shoulder System with a Press-Fit size 13 stem, a +0 baseplate, and a +0 poly. The glenoid was posterior superior augmented metaglene with 4-screw fixation, all 4 screws excellent purchase. The glenosphere was a standard 38-mm glenosphere. ESTIMATED BLOOD LOSS:  200-300 mL. IV FLUIDS:  Crystalloids were given. PREOPERATIVE MEDICINE:  2 g Ancef. HISTORY:  The patient is a 62-year-old gentleman who presented to my office with increased discomfort in the left shoulder. He has had 2 surgical procedures performed in the shoulder and was sent to my office for further evaluation. The patient had crepitation through elevation of the shoulder with limited elevation. He had pain through the superior arc of motion. He had decreased strength with supraspinatus testing as well as external rotation testing. He obviously had signs and symptoms consistent with chronic massive rotator cuff tendon tear. I reviewed his prior operative notes. He had an arthroscopic procedure performed at the [place] and one of my partners performed a rotator cuff repair.   He did have repeat studies which revealed a massive recurrent rotator cuff tendon tear with CNR letter sent   superior migration of the humeral head. I had a long conversation with the patient. We discussed the findings. He was quite familiar and aware of the findings. We talked about nonoperative treatment. Unfortunately, he had been through nonoperative treatment and his success was not good. He continued to have pain and dysfunction. Therefore, we engaged in a conversation in regard to reverse total shoulder arthroplasty. I went through the surgery and the technique. The biggest situation with this patient was his age as I explained this to the patient. At 61years of age, a reverse total shoulder replacement is a salvage procedure. If he is unhappy with a reverse total shoulder, there is no undoing and nothing else that could be done after reverse total shoulder. We also discussed the longevity of the reverse total shoulder. At the age of 61, he very well may require either surgical options in the future. I went through the surgery and the technique. We went through the use of metal and plastic. I described the surgical risks such as but not limited to postoperative pain, numbness and tingling, stiffness, DVT, PE, MI, CVA. He also understood there is no guarantee that he will regain his range of motion. We talked about numbness and tingling that may occur. We talked about infections and repeat surgeries as mentioned above. Lastly, we had a long conversation with regard to the rehabilitative process. Understanding the risks and benefits mentioned above, the patient wished to proceed, signed the consent, was taken to surgery. PROCEDURE:  The patient was taken back to surgery, placed supine on the operative table, administered general anesthetic, through an endotracheal intubation. He received interscalene block by the anesthesiologist.  He was then placed in a beach chair position with all bony prominences protected. The left upper extremity was sterilely prepped and draped x2.   A time-out was performed. All parties agreed that the left shoulder was the correct shoulder. Following the time-out, an anterior incision was made with a 10 blade. Sharp dissection was taken down the skin and blunt dissection was taken down through the dermis. The knife and handle was passed off the table. Blunt dissection was then taken down the cephalic vein. The cephalic vein was identified and retracted laterally as the deltopectoral interval was exploited. The subdeltoid region was then released of its adhesions. A massive rotator cuff tear was identified. A deep López retractor was deployed in the subdeltoid region. The subscapularis was released from the short head of the biceps as this was incredibly scarred from prior surgeries. Circumflex vasculature was identified and tied off with 2-0 Vicryl. The subscapularis was actually in pretty good condition. Two stay stitches of #1 Ethibond were placed in the musculotendinous junction of the subscapularis. Of note, there was significant wear that was noted on the long head of the biceps and the long head of the biceps had been medially subluxed. Therefore, an open biceps tenodesis was performed sewing this to the pectoralis tendon in situ. A small portion of the pectoralis tendon was taken down for exposure purposes. At that time, an arthrotomy was made through the subscapularis using electrocautery. The shoulder was then externally rotated as the inferior capsule was released from the osteophytes. The osteophytes were removed as the capsule was continued to release inferiorly. A Basim retractor was then deployed in the joint and a full release of the inferior capsule was performed. The shoulder was then dislocated up into the wound. The neck shaft angle was identified. A bone saw was then used to create the osteectomy. Bone was taken on the back table. Anchors were identified from prior rotator cuff fixation.   These anchors were not in the way of the IM canal entering, so as portions of these anchors were left alone. At that time, the IM canal was entered with a 7-mm reamer. We dilated this up to 13 and then we broached to 13. An 11 trial with a manhole cover was inserted and the shoulder was posteriorly dislocated. It was held in place with a posterior glenoid retractor. The subscapularis was released from its capsular attachments. The labrum was then taken from anterior-inferior and posterior-inferior. The outrigger for the navigation system was then deployed onto the coracoid using 2 bone pegs. We preoperatively templated him for a posterior-superior augment due to his wear pattern. We then referenced all the bony landmarks. The center of the drill site at the glenoid was then identified using navigation. A final hole was then made. We then reamed up for a size 42. At that time, we used navigation to create our center drill hole. On bone graft was placed into our metaglene and the metaglene was impacted into the glenoid using the navigation system. We then once again used our navigation system to help with our 4.5 locking screws. Four of these screws were placed, all had excellent purchase of bone. At that time, we proceeded with trial.  A 42-mm head trial was placed onto the metaglene. We then brought the shoulder out into the wound and placed a +0 poly and tray in place. With this arrangement, the shoulder was too tight to reduce. Therefore, the humeral trial components were removed. Further neck cut was performed of approximately 3 mm. We reamed and re-broached the proximal humerus. We then trialed and we were able to reduce with a +0 poly trial.  The shoulder had  great range of motion and stability and I felt this was most likely the correct size. At that time, our trial components were removed. The wound was copiously irrigated with pulsatile irrigation. 80 mg of gentamicin was injected into the inferior capsule.   A size 42 glenosphere was then opened and placed on the metaglene. The set screw was tightened. At that time, we brought our proximal humerus back up into the wound. The size 13 stem was inserted with excellent purchase of bone. We placed a +0 baseplate in place and then placed a +0 poly in place. We then attempted to reduce the joint. Unfortunately, the joint was unable to be reduced with this component arrangement. Despite being able to reduce this with our trial component, we could not reduce this using our final component of the humerus. Perhaps, there was just 1 or 2 or 3 mm difference in the height of the stem. I did try to impact the stem further into the proximal humerus, but I was unable to impact the stem further. At that time, I decided to downsize the glenosphere from 42 to 38, this would provide us a 2 mm difference. The set screw was removed and the 42 glenosphere was removed. The wound was then irrigated again. A 38-mm trial was inserted and we then trialed with a +0 trial on the humeral side. We were able to reduce and it seemed to be the correct size and it had great stability. I removed the trial components. The final 38 glenosphere was then placed on the metaglene and the set screw was inserted. We then trialed once again with the trial on the humerus side and it reduced quite well with appropriate tension and stability. The trial was dislocated one last time and our final +0 poly was placed onto the tray. The shoulder was reduced. Range of motion revealed great range of motion without any impedance and there was no evidence of instability. The wound was then copiously irrigated. We then rinsed out the wound with Irrisept. We left the Irrisept in place for over a minute and then irrigated out the Irrisept. The subscapularis was then repaired upon itself using #2 FiberWire in a figure-of-eight interrupted fashion. We also repaired the latissimus and the teres major.   At that time, further stitches were placed into the biceps to support the biceps tenodesis. The pectoralis was then repaired using #2 FiberWire as well. The deltopectoral interval was repaired using #1 Vicryl. 2-0 Vicryl was placed in the subcutaneous tissue and a running 4-0 Monocryl was placed in the epidermis. Steri-Strips were applied over the incision followed by Adaptic and bacitracin ointment. A sterile dry dressing was then placed over the shoulder. The patient was placed in immobilizer, awakened from general anesthetic in stable condition, transferred to recovery room.         MD BRITTANY [email or web address]  D:  10/07/2021 19:03  T:  10/08/2021 4:20  JOB #:  [id number]

## (undated) DEVICE — TUBING, SUCTION, 1/4" X 12', STRAIGHT: Brand: MEDLINE

## (undated) DEVICE — 3M™ STERI-DRAPE™ U-DRAPE 1015: Brand: STERI-DRAPE™

## (undated) DEVICE — SPONGE GZ W4XL4IN COT 12 PLY TYP VII WVN C FLD DSGN

## (undated) DEVICE — MARKER UTIL W/RULER AND LBL -- STRL

## (undated) DEVICE — GLOVE ORANGE PI 8 1/2   MSG9085

## (undated) DEVICE — SPONGE GZ W4XL4IN COT RADPQ HIGHLY ABSRB

## (undated) DEVICE — SCRUB DRY SURG EZ SCRUB BRUSH PREOPERATIVE GRN

## (undated) DEVICE — DRAPE,U/ SHT,SPLIT,PLAS,STERIL: Brand: MEDLINE

## (undated) DEVICE — PAD,ABDOMINAL,5"X9",ST,LF,25/BX: Brand: MEDLINE INDUSTRIES, INC.

## (undated) DEVICE — TUBING SUCT 12FR MAL ALUM SHFT FN CAP VENT UNIV CONN W/ OBT

## (undated) DEVICE — PENCIL ES L10FT COAT BLDE HOLSTER

## (undated) DEVICE — GARMENT,MEDLINE,DVT,INT,CALF,MED, GEN2: Brand: MEDLINE

## (undated) DEVICE — HANDPIECE SET WITH COAXIAL FAN SPRAY TIP AND SUCTION TUBE: Brand: INTERPULSE

## (undated) DEVICE — COVER,MAYO STAND,STERILE: Brand: MEDLINE

## (undated) DEVICE — SUTURE VCRL SZ 2-0 L36IN ABSRB UD L40MM CT 1/2 CIR J957H

## (undated) DEVICE — SOLUTION IRRIG 1000ML STRL H2O USP PLAS POUR BTL

## (undated) DEVICE — SUTURE VCRL SZ 1 L36IN ABSRB UD L36MM CT-1 1/2 CIR J947H

## (undated) DEVICE — GOWN,SIRUS,NONRNF,SETINSLV,2XL,18/CS: Brand: MEDLINE

## (undated) DEVICE — SOLUTION SURG PREP 26 CC PURPREP

## (undated) DEVICE — PACK SURG PROC KNEE USER GPS

## (undated) DEVICE — PREP KIT PEEL PTCH POVIDONE IOD

## (undated) DEVICE — STRIP,CLOSURE,WOUND,MEDI-STRIP,1/2X4: Brand: MEDLINE

## (undated) DEVICE — SUTURE ETHBND EXCEL SZ 1 L30IN NONABSORBABLE GRN L36MM CT-1 X425H

## (undated) DEVICE — SYR 3ML LL TIP 1/10ML GRAD --

## (undated) DEVICE — IMPLANTABLE DEVICE
Type: IMPLANTABLE DEVICE | Site: SHOULDER | Status: NON-FUNCTIONAL
Brand: EQUINOXE
Removed: 2021-10-07

## (undated) DEVICE — NEEDLE HYPO 22GA L1.5IN BLK POLYPR HUB S STL REG BVL STR

## (undated) DEVICE — IMMOBILIZER SHLDR M UNIV 65X17IN BLK LIGHWEIGHT PCH SZ REUSE

## (undated) DEVICE — SOLUTION IRRIG 3000ML 0.9% SOD CHL USP UROMATIC PLAS CONT

## (undated) DEVICE — BLADE SAW W098XL354IN THK0047IN CUT THK0047IN SAG FLR

## (undated) DEVICE — SUTURE MCRYL SZ 4-0 L27IN ABSRB UD L24MM PS-1 3/8 CIR PRIM Y935H

## (undated) DEVICE — SUTURE FIBERWIRE SZ 2 W/ TAPERED NEEDLE BLUE L38IN NONABSORB BLU L26.5MM 1/2 CIRCLE AR7200

## (undated) DEVICE — SOLUTION IRRIG 1000ML 0.9% SOD CHL USP POUR PLAS BTL

## (undated) DEVICE — TOTAL JOINT - SMH: Brand: MEDLINE INDUSTRIES, INC.